# Patient Record
Sex: MALE | Race: OTHER | NOT HISPANIC OR LATINO | Employment: OTHER | URBAN - METROPOLITAN AREA
[De-identification: names, ages, dates, MRNs, and addresses within clinical notes are randomized per-mention and may not be internally consistent; named-entity substitution may affect disease eponyms.]

---

## 2022-07-26 ENCOUNTER — NEW PATIENT COMPREHENSIVE (OUTPATIENT)
Dept: URBAN - METROPOLITAN AREA CLINIC 10 | Facility: CLINIC | Age: 67
End: 2022-07-26

## 2022-07-26 DIAGNOSIS — Z01.00: ICD-10-CM

## 2022-07-26 PROCEDURE — 92004 COMPRE OPH EXAM NEW PT 1/>: CPT

## 2022-07-26 ASSESSMENT — KERATOMETRY
OD_AXISANGLE_DEGREES: 31
OD_K1POWER_DIOPTERS: 43.75
OD_K2POWER_DIOPTERS: 44.25
OS_K2POWER_DIOPTERS: 44.00
OD_AXISANGLE2_DEGREES: 121
OS_AXISANGLE_DEGREES: 137
OS_AXISANGLE2_DEGREES: 47
OS_K1POWER_DIOPTERS: 43.50

## 2022-07-26 ASSESSMENT — VISUAL ACUITY
OD_SC: 20/20
OS_SC: 20/20
OU_CC: J1+

## 2022-07-26 ASSESSMENT — TONOMETRY
OS_IOP_MMHG: 13
OD_IOP_MMHG: 13

## 2024-01-02 ENCOUNTER — OFFICE VISIT (OUTPATIENT)
Dept: URGENT CARE | Facility: CLINIC | Age: 69
End: 2024-01-02
Payer: COMMERCIAL

## 2024-01-02 VITALS
OXYGEN SATURATION: 96 % | HEIGHT: 66 IN | HEART RATE: 71 BPM | RESPIRATION RATE: 18 BRPM | BODY MASS INDEX: 33.75 KG/M2 | TEMPERATURE: 97 F | WEIGHT: 210 LBS

## 2024-01-02 DIAGNOSIS — H69.92 DYSFUNCTION OF LEFT EUSTACHIAN TUBE: Primary | ICD-10-CM

## 2024-01-02 PROCEDURE — 99213 OFFICE O/P EST LOW 20 MIN: CPT | Performed by: PHYSICIAN ASSISTANT

## 2024-01-02 RX ORDER — PREDNISONE 20 MG/1
TABLET ORAL
Qty: 15 TABLET | Refills: 0 | Status: SHIPPED | OUTPATIENT
Start: 2024-01-02

## 2024-01-02 NOTE — PATIENT INSTRUCTIONS
1. Over-the-counter acetaminophen and/or ibuprofen as needed for pain.  2. Perform eustachian tube exercises as discussed for your ear pain frequently but gently throughout the course of the day.  3. Oxymetazoline nasal spray 2 sprays in each nostril every 12 hours for no more than the next 5 days.  4. Return here for any persistent symptoms at 3-5 days or if they worsen at any time.

## 2024-01-02 NOTE — PROGRESS NOTES
[facility]        NAME: Ramiro Singh is a 68 y.o. male  : 1955    MRN: [id number]  DATE: 2024  TIME: 10:00 AM    Assessment and Plan   Dysfunction of left eustachian tube [H69.92]  1. Dysfunction of left eustachian tube  predniSONE 20 mg tablet            Patient Instructions     1. Over-the-counter acetaminophen and/or ibuprofen as needed for pain.  2. Perform eustachian tube exercises as discussed for your ear pain frequently but gently throughout the course of the day.  3. Oxymetazoline nasal spray 2 sprays in each nostril every 12 hours for no more than the next 5 days.  4. Return here for any persistent symptoms at 3-5 days or if they worsen at any time.      Chief Complaint     Chief Complaint   Patient presents with    Earache     Ear pain left ear since last night         History of Present Illness       68-year-old male patient with a 1 day history of left-sided ear pressure, pain, slight decreased hearing.  No recent URI or allergy symptoms.  Patient denies bleeding or drainage.  Denies any tenderness.  No other associated symptoms.  Has not tried any over-the-counter medications.    Earache   Associated symptoms include hearing loss. Pertinent negatives include no abdominal pain, coughing, rash, sore throat or vomiting.       Review of Systems   Review of Systems   Constitutional:  Negative for chills and fever.   HENT:  Positive for ear pain and hearing loss. Negative for sore throat.    Eyes:  Negative for pain and visual disturbance.   Respiratory:  Negative for cough and shortness of breath.    Cardiovascular:  Negative for chest pain and palpitations.   Gastrointestinal:  Negative for abdominal pain and vomiting.   Genitourinary:  Negative for dysuria and hematuria.   Musculoskeletal:  Negative for arthralgias and back pain.   Skin:  Negative for color change and rash.   Neurological:  Negative for seizures and syncope.   All other systems reviewed and are  "negative.        Current Medications       Current Outpatient Medications:     predniSONE 20 mg tablet, Take 3 tabs all at once daily for 3 days then 2 tabs for 2 days then 1 tab for 2 days., Disp: 15 tablet, Rfl: 0    Current Allergies     Allergies as of 01/02/2024    (No Known Allergies)            The following portions of the patient's history were reviewed and updated as appropriate: allergies, current medications, past family history, past medical history, past social history, past surgical history and problem list.     No past medical history on file.    No past surgical history on file.    No family history on file.      Medications have been verified.        Objective   Pulse 71   Temp (!) 97 °F (36.1 °C)   Resp 18   Ht 5' 6\" (1.676 m)   Wt 95.3 kg (210 lb)   SpO2 96%   BMI 33.89 kg/m²        Physical Exam     Physical Exam  Vitals and nursing note reviewed.   Constitutional:       General: He is not in acute distress.     Appearance: Normal appearance. He is not ill-appearing.   HENT:      Head: Normocephalic.      Right Ear: Tympanic membrane normal.      Ears:      Comments: Left TM is injected, slightly retracted.  No effusion or perforation seen.     Nose: No congestion or rhinorrhea.      Mouth/Throat:      Mouth: Mucous membranes are moist.      Pharynx: Oropharynx is clear.   Eyes:      Conjunctiva/sclera: Conjunctivae normal.      Pupils: Pupils are equal, round, and reactive to light.   Cardiovascular:      Rate and Rhythm: Normal rate and regular rhythm.      Pulses: Normal pulses.   Pulmonary:      Effort: Pulmonary effort is normal.      Breath sounds: Normal breath sounds.   Abdominal:      Tenderness: There is no abdominal tenderness.   Musculoskeletal:         General: Normal range of motion.      Cervical back: Normal range of motion and neck supple.   Skin:     General: Skin is warm and dry.      Capillary Refill: Capillary refill takes less than 2 seconds.   Neurological:      " Mental Status: He is alert and oriented to person, place, and time.   Psychiatric:         Mood and Affect: Mood normal.         Behavior: Behavior normal.

## 2024-07-12 ENCOUNTER — TELEPHONE (OUTPATIENT)
Age: 69
End: 2024-07-12

## 2024-07-12 NOTE — TELEPHONE ENCOUNTER
Patient's spouse called today to establish patient for gross hematuria.    Patient is scheduled in [place] on 7/15 at 7:20 AM with MEG Valladares.    Pt's spouse states the patient will be paying out of pocket for the visit.    Caller was provided general info regarding self pay protocol and was given the price checking tel number and website for updated rates.    Call back if needed [phone]

## 2024-07-12 NOTE — PROGRESS NOTES
7/15/2024      Chief Complaint   Patient presents with    Consult    Gross Hematuria         Assessment and Plan    69 y.o. male new patient    Gross hematuria   - Urine dip today skewed due to pyridium. Complete Bactrim as prescribed  - PVR- 8 mL   - Obtain CT urogram  - ERNESTINE negative. Obtain PSA  - Return for cystoscopy for complete work-up  - ED precautions reviewed.      History of Present Illness  Ramiro Singh is a 69 y.o. male here for new patient evaluation of gross hematuria. He reports recent episodes of gross hematuria with increased activity. He was reportedly seen at an urgent care and started on Bactrim and pyridium. No urine testing on file for review. He reports he was also having some dysuria but this resolved. He does note some intermittent right lower back pain. He has prior history of Greenlight prostate surgery 10 years ago with good benefit. No family history of  malignancy. Former smoker.       Review of Systems   Constitutional:  Negative for chills and fever.   Respiratory:  Negative for shortness of breath.    Cardiovascular:  Negative for chest pain.   Gastrointestinal:  Negative for abdominal pain.   Genitourinary:  Positive for flank pain. Negative for difficulty urinating, dysuria, frequency, hematuria, testicular pain and urgency.   Musculoskeletal:  Positive for back pain.   Neurological:  Negative for dizziness.       Past Medical History  Past Medical History:   Diagnosis Date    Urinary tract infection        Past Social History  Past Surgical History:   Procedure Laterality Date    PROSTATE SURGERY      WISDOM TOOTH EXTRACTION       Social History     Tobacco Use   Smoking Status Some Days    Types: Cigars    Passive exposure: Past   Smokeless Tobacco Never       Past Family History  History reviewed. No pertinent family history.    Past Social history  Social History     Socioeconomic History    Marital status: /Civil Union     Spouse name: Not on file    Number of  "children: Not on file    Years of education: Not on file    Highest education level: Not on file   Occupational History    Not on file   Tobacco Use    Smoking status: Some Days     Types: Cigars     Passive exposure: Past    Smokeless tobacco: Never   Vaping Use    Vaping status: Never Used   Substance and Sexual Activity    Alcohol use: Yes     Comment: ocass    Drug use: Never    Sexual activity: Not on file   Other Topics Concern    Not on file   Social History Narrative    Not on file     Social Determinants of Health     Financial Resource Strain: Not on file   Food Insecurity: Not on file   Transportation Needs: Not on file   Physical Activity: Not on file   Stress: Not on file   Social Connections: Not on file   Intimate Partner Violence: Not on file   Housing Stability: Not on file       Current Medications  Current Outpatient Medications   Medication Sig Dispense Refill    phenazopyridine (PYRIDIUM) 200 mg tablet PLEASE SEE ATTACHED FOR DETAILED DIRECTIONS      predniSONE 20 mg tablet Take 3 tabs all at once daily for 3 days then 2 tabs for 2 days then 1 tab for 2 days. 15 tablet 0    sulfamethoxazole-trimethoprim (BACTRIM DS) 800-160 mg per tablet take 1 tablet by mouth twice a day for 14 days       No current facility-administered medications for this visit.       Allergies  No Known Allergies      The following portions of the patient's history were reviewed and updated as appropriate: allergies, current medications, past medical history, past social history, past surgical history and problem list.      Vitals  Vitals:    07/15/24 0729   BP: 118/72   BP Location: Left arm   Patient Position: Sitting   Cuff Size: Standard   Pulse: 77   Resp: 18   Temp: 97.5 °F (36.4 °C)   SpO2: 94%   Weight: 92.6 kg (204 lb 3.2 oz)   Height: 5' 6\" (1.676 m)           Physical Exam  Physical Exam  Constitutional:       Appearance: Normal appearance.   HENT:      Head: Normocephalic and atraumatic.      Right Ear: External " "ear normal.      Left Ear: External ear normal.      Nose: Nose normal.   Eyes:      General: No scleral icterus.     Conjunctiva/sclera: Conjunctivae normal.   Cardiovascular:      Pulses: Normal pulses.   Pulmonary:      Effort: Pulmonary effort is normal.   Genitourinary:     Comments: No prostatic nodularity or tenderness  Musculoskeletal:         General: Normal range of motion.      Cervical back: Normal range of motion.   Skin:     General: Skin is warm and dry.   Neurological:      General: No focal deficit present.      Mental Status: He is alert and oriented to person, place, and time.   Psychiatric:         Mood and Affect: Mood normal.         Behavior: Behavior normal.         Thought Content: Thought content normal.         Judgment: Judgment normal.           Results  Recent Results (from the past 1 hour(s))   POCT Measure PVR    Collection Time: 07/15/24  7:31 AM   Result Value Ref Range    POST-VOID RESIDUAL VOLUME, ML POC 8 mL   POCT urine dip    Collection Time: 07/15/24  7:35 AM   Result Value Ref Range    BLOOD,UA -     SPECIFIC GRAVITY,UA 1.015     GLUCOSE, UA -      COLOR,UA red     CLARITY,UA clear    ]  No results found for: \"PSA\"  No results found for: \"GLUCOSE\", \"CALCIUM\", \"NA\", \"K\", \"CO2\", \"CL\", \"BUN\", \"CREATININE\"  No results found for: \"WBC\", \"HGB\", \"HCT\", \"MCV\", \"PLT\"        Orders  Orders Placed This Encounter   Procedures    POCT Measure PVR    POCT urine dip       Alicia Potter"

## 2024-07-15 ENCOUNTER — OFFICE VISIT (OUTPATIENT)
Dept: UROLOGY | Facility: CLINIC | Age: 69
End: 2024-07-15

## 2024-07-15 VITALS
BODY MASS INDEX: 32.82 KG/M2 | HEIGHT: 66 IN | RESPIRATION RATE: 18 BRPM | SYSTOLIC BLOOD PRESSURE: 118 MMHG | DIASTOLIC BLOOD PRESSURE: 72 MMHG | HEART RATE: 77 BPM | TEMPERATURE: 97.5 F | WEIGHT: 204.2 LBS | OXYGEN SATURATION: 94 %

## 2024-07-15 DIAGNOSIS — R31.0 GROSS HEMATURIA: Primary | ICD-10-CM

## 2024-07-15 DIAGNOSIS — Z12.5 SCREENING FOR PROSTATE CANCER: ICD-10-CM

## 2024-07-15 LAB
POST-VOID RESIDUAL VOLUME, ML POC: 8 ML
SL AMB  POCT GLUCOSE, UA: NORMAL
SL AMB POCT BLOOD,UA: NORMAL
SL AMB POCT CLARITY,UA: CLEAR
SL AMB POCT COLOR,UA: NORMAL
SL AMB POCT SPECIFIC GRAVITY,UA: 1.01

## 2024-07-15 PROCEDURE — 51798 US URINE CAPACITY MEASURE: CPT | Performed by: PHYSICIAN ASSISTANT

## 2024-07-15 PROCEDURE — 99204 OFFICE O/P NEW MOD 45 MIN: CPT | Performed by: PHYSICIAN ASSISTANT

## 2024-07-15 PROCEDURE — 88112 CYTOPATH CELL ENHANCE TECH: CPT | Performed by: PATHOLOGY

## 2024-07-15 PROCEDURE — 81002 URINALYSIS NONAUTO W/O SCOPE: CPT | Performed by: PHYSICIAN ASSISTANT

## 2024-07-15 RX ORDER — PHENAZOPYRIDINE HYDROCHLORIDE 200 MG/1
TABLET, FILM COATED ORAL
COMMUNITY
Start: 2024-07-12

## 2024-07-15 RX ORDER — SULFAMETHOXAZOLE AND TRIMETHOPRIM 800; 160 MG/1; MG/1
TABLET ORAL
COMMUNITY
Start: 2024-07-12

## 2024-07-17 PROCEDURE — 88112 CYTOPATH CELL ENHANCE TECH: CPT | Performed by: PATHOLOGY

## 2024-07-22 ENCOUNTER — TELEPHONE (OUTPATIENT)
Dept: OTHER | Facility: HOSPITAL | Age: 69
End: 2024-07-22

## 2024-07-22 ENCOUNTER — HOSPITAL ENCOUNTER (OUTPATIENT)
Dept: RADIOLOGY | Facility: HOSPITAL | Age: 69
Discharge: HOME | End: 2024-07-22
Payer: COMMERCIAL

## 2024-07-22 DIAGNOSIS — R31.0 GROSS HEMATURIA: ICD-10-CM

## 2024-07-22 PROCEDURE — 74178 CT ABD&PLV WO CNTR FLWD CNTR: CPT

## 2024-07-22 RX ADMIN — IOHEXOL 100 ML: 350 INJECTION, SOLUTION INTRAVENOUS at 08:12

## 2024-07-22 NOTE — TELEPHONE ENCOUNTER
Patient seen by Alicia at [place]-gross hematuria    Please let patient know his CT scan is finalized.  There is a stone in the bladder.  He has upcoming cystoscopy in about 2 weeks.  He still needs this for workup- will discuss the ct details then.

## 2024-07-24 NOTE — TELEPHONE ENCOUNTER
Lvm providing office number     If pt calls back please relay Nancy GAMBOA message     Please let patient know his CT scan is finalized.  There is a stone in the bladder.  He has upcoming cystoscopy in about 2 weeks.  He still needs this for workup- will discuss the ct details then.

## 2024-07-26 NOTE — TELEPHONE ENCOUNTER
Patient returned missed call.    Message from provider was relayed.     Pt confirmed 8/2 appt 9:30 Cysto.    No further action is needed at this time.

## 2024-07-26 NOTE — TELEPHONE ENCOUNTER
Called and left VM for the PT to call the Office back. Office number left for the PT.    IF PT calls back please let PT know results as well as Nancy NAVAS-MARYBETH recommendations and Confirm Cysto appt.

## 2024-08-01 PROBLEM — R31.0 GROSS HEMATURIA: Status: ACTIVE | Noted: 2024-08-01

## 2024-08-01 PROBLEM — Z71.2 ENCOUNTER TO DISCUSS TEST RESULTS: Status: ACTIVE | Noted: 2024-08-01

## 2024-08-01 PROBLEM — N40.1 BPH WITH OBSTRUCTION/LOWER URINARY TRACT SYMPTOMS: Status: ACTIVE | Noted: 2024-08-01

## 2024-08-01 PROBLEM — N13.8 BPH WITH OBSTRUCTION/LOWER URINARY TRACT SYMPTOMS: Status: ACTIVE | Noted: 2024-08-01

## 2024-08-01 NOTE — PROGRESS NOTES
Ambulatory Visit  Name: Ramiro Singh      : 1955      MRN: [id number]  Encounter Provider: Anmol Rizvi MD  Encounter Date: 2024   Encounter department: [facility] [place]    Assessment & Plan   1. BPH with obstruction/lower urinary tract symptoms  Assessment & Plan:  Enlarged prostate with bleeding and slow stream, some nocturia as well.  He did have a greenlight laser prostatectomy previously many years ago which did help some with his symptoms.  I spoke with him about the pre-, jason-, postoperative care for transurethral resection of prostate, he is amenable, we will proceed to this in roughly 3 months  Orders:  -     finasteride (PROSCAR) 5 mg tablet; Take 1 tablet (5 mg total) by mouth daily  -     Case request operating room: TRANSURETHRAL RESECTION OF PROSTATE (TURP); Standing  -     Case request operating room: TRANSURETHRAL RESECTION OF PROSTATE (TURP)  2. Gross hematuria  Assessment & Plan:  Gross hematuria with clots, imaging shows no hydronephrosis, no stones, no renal masses.  Likely from his enlarged prostate, recommend TURP  Orders:  -     Case request operating room: TRANSURETHRAL RESECTION OF PROSTATE (TURP); Standing  -     Case request operating room: TRANSURETHRAL RESECTION OF PROSTATE (TURP)  3. Encounter to discuss test results  Assessment & Plan:  All findings reviewed with him in real-time.  All questions and concerns answered and addressed.      4. Bladder stone  Assessment & Plan:  CT renal protocol shows a likely bladder stone versus dystrophic calcification from previous greenlight laser prostatectomy.  Unable to place cystoscope today due to fossa navicularis stricture.  Recommend transurethral resection of prostate with all indicated procedures after a 3-month period of 5 alpha reductase inhibition given his 86 g gland  5. Postprocedural stricture of anterior urethra  Assessment & Plan:  Stricture at the fossa navicularis, approximately 5-6 Hong Konger,  "unable to pass a cystoscope.  We can dilate this at the time of his surgery          History of Present Illness     Ramiro Singh is a 69 y.o. male who presents with gross hematuria and lower urinary tract symptoms, hematuria has resolved.  Upper tract imaging is negative, he is seen to have a bladder stone.  Unable to perform cystoscopy today due to a fossa navicularis stricture.    Amenable to TURP.    The following portions of the patient's history were reviewed and updated as appropriate: allergies, current medications, past family history, past medical history, past social history, past surgical history and problem list.    Review of Systems   Constitutional: Negative.    HENT: Negative.     Eyes: Negative.    Respiratory: Negative.     Cardiovascular: Negative.    Gastrointestinal: Negative.    Endocrine: Negative.    Genitourinary:  Positive for difficulty urinating and hematuria.   Musculoskeletal: Negative.    Skin: Negative.    Allergic/Immunologic: Negative.    Neurological: Negative.    Hematological: Negative.    Psychiatric/Behavioral: Negative.           Objective     /68 (BP Location: Left arm, Patient Position: Sitting, Cuff Size: Standard)   Pulse 84   Ht 5' 6\" (1.676 m)   Wt 93 kg (205 lb)   SpO2 98%   BMI 33.09 kg/m²   Physical Exam  Vitals reviewed.   Constitutional:       General: He is not in acute distress.     Appearance: Normal appearance. He is well-developed. He is not ill-appearing, toxic-appearing or diaphoretic.   HENT:      Head: Normocephalic and atraumatic.      Nose: Nose normal.      Mouth/Throat:      Mouth: Mucous membranes are moist.   Eyes:      General: No scleral icterus.        Right eye: No discharge.         Left eye: No discharge.   Neck:      Thyroid: No thyromegaly.      Trachea: No tracheal deviation.   Pulmonary:      Effort: Pulmonary effort is normal.   Chest:      Chest wall: No tenderness.   Abdominal:      General: There is no distension.      " "Palpations: There is no mass.      Tenderness: There is no abdominal tenderness. There is no guarding.      Hernia: No hernia is present.   Genitourinary:     Penis: Normal.       Comments: Uncircumcised, foreskin reduced down over the head of the penis  Musculoskeletal:         General: No deformity or signs of injury. Normal range of motion.      Cervical back: Normal range of motion and neck supple.   Lymphadenopathy:      Cervical: No cervical adenopathy.   Skin:     General: Skin is dry.      Coloration: Skin is not jaundiced or pale.      Findings: No erythema.   Neurological:      Mental Status: He is alert and oriented to person, place, and time.      Cranial Nerves: No cranial nerve deficit.      Motor: No abnormal muscle tone.      Coordination: Coordination normal.   Psychiatric:         Mood and Affect: Mood normal.         Behavior: Behavior normal.         Thought Content: Thought content normal.         Judgment: Judgment normal.       Results  No results found for: \"PSA\"  No results found for: \"GLUCOSE\", \"CALCIUM\", \"NA\", \"K\", \"CO2\", \"CL\", \"BUN\", \"CREATININE\"  No results found for: \"WBC\", \"HGB\", \"HCT\", \"MCV\", \"PLT\"    Office Urine Dip  No results found for this or any previous visit (from the past 1 hour(s)).]    Administrative Statements           "

## 2024-08-02 ENCOUNTER — PROCEDURE VISIT (OUTPATIENT)
Dept: UROLOGY | Facility: CLINIC | Age: 69
End: 2024-08-02
Payer: COMMERCIAL

## 2024-08-02 VITALS
HEART RATE: 84 BPM | SYSTOLIC BLOOD PRESSURE: 120 MMHG | WEIGHT: 205 LBS | HEIGHT: 66 IN | OXYGEN SATURATION: 98 % | BODY MASS INDEX: 32.95 KG/M2 | DIASTOLIC BLOOD PRESSURE: 68 MMHG

## 2024-08-02 DIAGNOSIS — Z71.2 ENCOUNTER TO DISCUSS TEST RESULTS: Primary | ICD-10-CM

## 2024-08-02 DIAGNOSIS — N40.1 BPH WITH OBSTRUCTION/LOWER URINARY TRACT SYMPTOMS: ICD-10-CM

## 2024-08-02 DIAGNOSIS — R31.0 GROSS HEMATURIA: ICD-10-CM

## 2024-08-02 DIAGNOSIS — N99.114 POSTPROCEDURAL STRICTURE OF ANTERIOR URETHRA: ICD-10-CM

## 2024-08-02 DIAGNOSIS — N13.8 BPH WITH OBSTRUCTION/LOWER URINARY TRACT SYMPTOMS: ICD-10-CM

## 2024-08-02 DIAGNOSIS — N21.0 BLADDER STONE: ICD-10-CM

## 2024-08-02 PROCEDURE — 52000 CYSTOURETHROSCOPY: CPT | Performed by: UROLOGY

## 2024-08-02 PROCEDURE — 76872 US TRANSRECTAL: CPT | Performed by: UROLOGY

## 2024-08-02 PROCEDURE — 99204 OFFICE O/P NEW MOD 45 MIN: CPT | Performed by: UROLOGY

## 2024-08-02 RX ORDER — FINASTERIDE 5 MG/1
5 TABLET, FILM COATED ORAL DAILY
Qty: 90 TABLET | Refills: 3 | Status: SHIPPED | OUTPATIENT
Start: 2024-08-02 | End: 2025-07-28

## 2024-08-02 RX ORDER — ACETAMINOPHEN 325 MG/1
975 TABLET ORAL ONCE
OUTPATIENT
Start: 2024-08-02 | End: 2024-08-02

## 2024-08-02 NOTE — ASSESSMENT & PLAN NOTE
All findings reviewed with him in real-time.  All questions and concerns answered and addressed.

## 2024-08-02 NOTE — ASSESSMENT & PLAN NOTE
Gross hematuria with clots, imaging shows no hydronephrosis, no stones, no renal masses.  Likely from his enlarged prostate, recommend TURP

## 2024-08-02 NOTE — ASSESSMENT & PLAN NOTE
Stricture at the fossa navicularis, approximately 5-6 Nepalese, unable to pass a cystoscope.  We can dilate this at the time of his surgery

## 2024-08-02 NOTE — PROGRESS NOTES
Office TRUS     Indication    Benign prostatic enlargement with lower urinary tract symptoms    Informed consent   The risks, benefits and alternatives to this procedures were discussed with the patient and he has participated in the informed consent process and wishes to proceed        Transrectal ultrasonography  The patient was placed in the left lateral decubitus position. After an attentive digital rectal examination, a 7.5 mHz sidefire ultrasound probe was gently inserted into the rectum and biplanar imaging of the prostate was done with the findings noted below. Images were taken of any abnormal findings and also to document prostate size.    Bladder  The bladder base appeared normal.    Prostate      Ultrasound size measurements:  -Volume:  86 cm3    Ultrasound findings:  -Cysts: None  -Masses: None  -Median lobe: absent                  Complications  There were no procedural complications.    Disposition  The patient was dismissed to home     Post-procedure instructions:     Today he underwent an uncomplicated transrectal ultrasound showing a 86 g gland with bilobar hypertrophy and elevation of the bladder neck.  I recommend a TURP after a period of treatment with a 5 alpha reductase inhibitor.        Biopsy prostate     Date/Time  8/2/2024 9:30 AM     Performed by  Anmol Rizvi MD   Authorized by  Anmol Rizvi MD     Universal Protocol   Consent: Verbal consent obtained. Written consent obtained.  Risks and benefits: risks, benefits and alternatives were discussed  Consent given by: patient  Patient understanding: patient states understanding of the procedure being performed  Patient consent: the patient's understanding of the procedure matches consent given  Procedure consent: procedure consent matches procedure scheduled  Relevant documents: relevant documents present and verified  Test results: test results available and properly labeled  Site marked: the operative site was not  marked  Radiology Images displayed and confirmed. If images not available, report reviewed: imaging studies available  Required items: required blood products, implants, devices, and special equipment available  Patient identity confirmed: verbally with patient and provided demographic data      Local anesthesia used: no     Anesthesia   Local anesthesia used: no     Sedation   Patient sedated: no        Specimen: no    Culture: no   Procedure Details   Procedure Notes: Office TRUS     Indication    Benign prostatic enlargement with lower urinary tract symptoms    Informed consent   The risks, benefits and alternatives to this procedures were discussed with the patient and he has participated in the informed consent process and wishes to proceed        Transrectal ultrasonography  The patient was placed in the left lateral decubitus position. After an attentive digital rectal examination, a 7.5 mHz sidefire ultrasound probe was gently inserted into the rectum and biplanar imaging of the prostate was done with the findings noted below. Images were taken of any abnormal findings and also to document prostate size.    Bladder  The bladder base appeared normal.    Prostate      Ultrasound size measurements:  -Volume:  86 cm3    Ultrasound findings:  -Cysts: None  -Masses: None  -Median lobe: absent                  Complications  There were no procedural complications.    Disposition  The patient was dismissed to home     Post-procedure instructions:     Today he underwent an uncomplicated transrectal ultrasound showing a 86 g gland with bilobar hypertrophy and elevation of the bladder neck.  I recommend a TURP after a period of treatment with a 5 alpha reductase inhibitor.  Patient Transportation: confirmed  Patient tolerance: patient tolerated the procedure well with no immediate complications

## 2024-08-02 NOTE — ASSESSMENT & PLAN NOTE
CT renal protocol shows a likely bladder stone versus dystrophic calcification from previous greenlight laser prostatectomy.  Unable to place cystoscope today due to fossa navicularis stricture.  Recommend transurethral resection of prostate with all indicated procedures after a 3-month period of 5 alpha reductase inhibition given his 86 g gland

## 2024-08-02 NOTE — ASSESSMENT & PLAN NOTE
Enlarged prostate with bleeding and slow stream, some nocturia as well.  He did have a greenlight laser prostatectomy previously many years ago which did help some with his symptoms.  I spoke with him about the pre-, jason-, postoperative care for transurethral resection of prostate, he is amenable, we will proceed to this in roughly 3 months

## 2024-08-02 NOTE — PROGRESS NOTES
Office Cystoscopy Procedure Note    Indication:    Hematuria    Informed consent   The risks, benefits, complications, treatment options, and expected outcomes were discussed with the patient. The patient concurred with the proposed plan and provided informed consent.    Anesthesia  Lidocaine jelly 2%    Antibiotic prophylaxis   None    Procedure  The patient was placed in the supineposition, was prepped and draped in the usual manner using sterile technique, and 2% lidocaine jelly instilled into the urethra.  A 17 F flexible cystoscope was then inserted into the urethra and the urethra and bladder carefully examined.  The following findings were noted:    Findings:  Urethra:  Abnormal, fossa navicularis stricture, unable to pass scope    Specimens: None                 Complications:    None; patient tolerated the procedure well           Disposition: To home            Condition: Stable    Plan: Recommend dilation of stricture at the time of transurethral resection of prostate.       Cystoscopy     Date/Time  8/2/2024 9:30 AM     Performed by  Anmol Rizvi MD   Authorized by  Anmol Rizvi MD     Universal Protocol:  Consent: Verbal consent obtained. Written consent obtained.  Risks and benefits: risks, benefits and alternatives were discussed  Consent given by: patient  Patient understanding: patient states understanding of the procedure being performed  Patient consent: the patient's understanding of the procedure matches consent given  Procedure consent: procedure consent matches procedure scheduled  Relevant documents: relevant documents present and verified  Test results: test results available and properly labeled  Site marked: the operative site was not marked  Radiology Images displayed and confirmed. If images not available, report reviewed: imaging studies available  Required items: required blood products, implants, devices, and special equipment available  Patient identity confirmed: verbally  with patient and provided demographic data      Procedure Details:  Procedure type: cystoscopy    Patient tolerance: Patient tolerated the procedure well with no immediate complications    Additional Procedure Details: Office Cystoscopy Procedure Note    Indication:    Hematuria    Informed consent   The risks, benefits, complications, treatment options, and expected outcomes were discussed with the patient. The patient concurred with the proposed plan and provided informed consent.    Anesthesia  Lidocaine jelly 2%    Antibiotic prophylaxis   None    Procedure  The patient was placed in the supineposition, was prepped and draped in the usual manner using sterile technique, and 2% lidocaine jelly instilled into the urethra.  A 17 F flexible cystoscope was then inserted into the urethra and the urethra and bladder carefully examined.  The following findings were noted:    Findings:  Urethra:  Abnormal, fossa navicularis stricture, unable to pass scope    Specimens: None                 Complications:    None; patient tolerated the procedure well           Disposition: To home            Condition: Stable    Plan: Recommend dilation of stricture at the time of transurethral resection of prostate.

## 2024-08-07 ENCOUNTER — PREP FOR PROCEDURE (OUTPATIENT)
Dept: UROLOGY | Facility: CLINIC | Age: 69
End: 2024-08-07

## 2024-08-07 ENCOUNTER — TELEPHONE (OUTPATIENT)
Dept: UROLOGY | Facility: CLINIC | Age: 69
End: 2024-08-07

## 2024-08-07 DIAGNOSIS — R39.89 SUSPECTED UTI: ICD-10-CM

## 2024-08-07 DIAGNOSIS — Z01.810 PRE-OPERATIVE CARDIOVASCULAR EXAMINATION: ICD-10-CM

## 2024-08-07 DIAGNOSIS — N13.8 BPH WITH OBSTRUCTION/LOWER URINARY TRACT SYMPTOMS: Primary | ICD-10-CM

## 2024-08-07 DIAGNOSIS — Z79.01 LONG TERM (CURRENT) USE OF ANTICOAGULANTS: ICD-10-CM

## 2024-08-07 DIAGNOSIS — R31.0 GROSS HEMATURIA: ICD-10-CM

## 2024-08-07 DIAGNOSIS — N40.1 BPH WITH OBSTRUCTION/LOWER URINARY TRACT SYMPTOMS: Primary | ICD-10-CM

## 2024-08-07 DIAGNOSIS — Z01.812 PRE-OPERATIVE LABORATORY EXAMINATION: ICD-10-CM

## 2024-08-07 NOTE — TELEPHONE ENCOUNTER
Spoke with patient and confirmed surgery date of:11/19/24  Type of surgery:TURP  Operating physician: Dr. Rizvi  Location of surgery: [facility]    Verbally went over prep with patient on: 8/7/24  NPO  Bowel prep? No  Hospital calls afternoon prior with arrival time -Calls Friday afternoon for Monday surgeries  Patient needs ride to and from surgery (outpatient/inpatient)   Pre-op testing to be done 2 weeks prior to surgery/PRE OP LABS, URINE CULTURE EKG ORDERED FOR 10/29/24  (list labs needed)  Blood thinners:   List blood thinner/how long needs to held or no hold needed  Clearances needed: (Medical/Cardiac/None)    Mailed/emailed to patient on:EMAILED AND MAILED TO PT 8/8/24  Copy of packet scanned into Media on:8/8/24  Labs in packet  Soap / Bowel prep in packet  Pre-op & Post-op in packet  Dates of H&P and post-op if needed    Consent: in Media or on admit  If needed:CONSENT TO BE SIGNED AT PRE OP APPT    Medical/Cardiac Clearance:  Appt with:  Appt date and time:  Date clearance form faxed:  Best fax number:    Medication Suspension of: Medication Name / how many days  Ordering provider:  Faxed Medication Suspension form on:  Best fax number:    [place]:  Faxed form to pharmacy on:    RBC blood bank done on:    Rep info:  Emailed rep on date:

## 2024-11-02 ENCOUNTER — APPOINTMENT (OUTPATIENT)
Dept: LAB | Facility: HOSPITAL | Age: 69
End: 2024-11-02
Payer: COMMERCIAL

## 2024-11-02 ENCOUNTER — APPOINTMENT (OUTPATIENT)
Dept: LAB | Facility: HOSPITAL | Age: 69
End: 2024-11-02

## 2024-11-02 DIAGNOSIS — R39.89 SUSPECTED UTI: ICD-10-CM

## 2024-11-02 DIAGNOSIS — Z79.01 LONG TERM (CURRENT) USE OF ANTICOAGULANTS: ICD-10-CM

## 2024-11-02 DIAGNOSIS — R31.0 GROSS HEMATURIA: ICD-10-CM

## 2024-11-02 DIAGNOSIS — N13.8 BPH WITH OBSTRUCTION/LOWER URINARY TRACT SYMPTOMS: ICD-10-CM

## 2024-11-02 DIAGNOSIS — N40.1 BPH WITH OBSTRUCTION/LOWER URINARY TRACT SYMPTOMS: ICD-10-CM

## 2024-11-02 DIAGNOSIS — Z01.812 PRE-OPERATIVE LABORATORY EXAMINATION: ICD-10-CM

## 2024-11-02 DIAGNOSIS — Z01.810 PRE-OPERATIVE CARDIOVASCULAR EXAMINATION: ICD-10-CM

## 2024-11-02 DIAGNOSIS — Z12.5 SCREENING FOR PROSTATE CANCER: ICD-10-CM

## 2024-11-02 LAB — PSA SERPL-MCNC: 1.81 NG/ML (ref 0–4)

## 2024-11-02 PROCEDURE — 86850 RBC ANTIBODY SCREEN: CPT | Performed by: UROLOGY

## 2024-11-02 PROCEDURE — G0103 PSA SCREENING: HCPCS

## 2024-11-02 PROCEDURE — 86901 BLOOD TYPING SEROLOGIC RH(D): CPT | Performed by: UROLOGY

## 2024-11-02 PROCEDURE — 86900 BLOOD TYPING SEROLOGIC ABO: CPT | Performed by: UROLOGY

## 2024-11-03 ENCOUNTER — LAB REQUISITION (OUTPATIENT)
Dept: LAB | Facility: HOSPITAL | Age: 69
End: 2024-11-03
Payer: COMMERCIAL

## 2024-11-03 DIAGNOSIS — R31.0 GROSS HEMATURIA: ICD-10-CM

## 2024-11-03 DIAGNOSIS — N13.8 OTHER OBSTRUCTIVE AND REFLUX UROPATHY: ICD-10-CM

## 2024-11-03 DIAGNOSIS — N40.1 BENIGN PROSTATIC HYPERPLASIA WITH LOWER URINARY TRACT SYMPTOMS: ICD-10-CM

## 2024-11-03 LAB
ABO GROUP BLD: NORMAL
BLD GP AB SCN SERPL QL: NEGATIVE
RH BLD: NEGATIVE
SPECIMEN EXPIRATION DATE: NORMAL

## 2024-11-05 ENCOUNTER — TELEPHONE (OUTPATIENT)
Dept: UROLOGY | Facility: CLINIC | Age: 69
End: 2024-11-05

## 2024-11-05 DIAGNOSIS — R82.71 BACTERIURIA: Primary | ICD-10-CM

## 2024-11-05 PROCEDURE — 87086 URINE CULTURE/COLONY COUNT: CPT

## 2024-11-05 RX ORDER — METRONIDAZOLE 500 MG/1
500 TABLET ORAL EVERY 8 HOURS SCHEDULED
Qty: 21 TABLET | Refills: 0 | Status: SHIPPED | OUTPATIENT
Start: 2024-11-05 | End: 2024-11-12

## 2024-11-05 NOTE — TELEPHONE ENCOUNTER
Please call the patient and let him know that I am treating him with Flagyl for 7 days based on his preoperative urine culture, please have him start this in preparation for TURP.  He can start this medication now

## 2024-11-05 NOTE — TELEPHONE ENCOUNTER
Spoke w/ pt/pt wants to reschedule his upcoming surgery at this time/pt rescheduled form 11/19/2024 w/ Dr Rizvi at [facility] to 1/21/2025

## 2024-11-05 NOTE — TELEPHONE ENCOUNTER
Patient was not seen today for pre-op appointment. He wants to reschedule his surgery further out and per Alejandra, his pre op appointment will need to be rescheduled as well. Patient was not seen on 11/5. Please assist patient in rescheduling surgery. Thank you!

## 2024-11-06 ENCOUNTER — TELEPHONE (OUTPATIENT)
Dept: UROLOGY | Facility: CLINIC | Age: 69
End: 2024-11-06

## 2024-11-06 LAB — BACTERIA UR CULT: NORMAL

## 2024-11-06 NOTE — TELEPHONE ENCOUNTER
Called and spoke directly with patient and advised of provider note below:    ----- Message from KARRI Hackett sent at 11/6/2024  2:09 PM EST -----  Patient's urine is negative for infection.     Patient verbalized understanding and thanked the office for the call

## 2024-11-07 LAB
ATRIAL RATE: 77 BPM
P AXIS: 51 DEGREES
PR INTERVAL: 180 MS
QRS AXIS: -18 DEGREES
QRSD INTERVAL: 92 MS
QT INTERVAL: 360 MS
QTC INTERVAL: 407 MS
T WAVE AXIS: 35 DEGREES
VENTRICULAR RATE: 77 BPM

## 2024-11-07 PROCEDURE — 93010 ELECTROCARDIOGRAM REPORT: CPT | Performed by: INTERNAL MEDICINE

## 2025-05-01 NOTE — PRE-PROCEDURE INSTRUCTIONS
Pre-Surgery Instructions:   Medication Instructions    loratadine-pseudoephedrine (CLARITIN-D 12-HOUR) 5-120 mg per tablet Hold day of surgery      Medication instructions for day of surgery reviewed. Please take all instructed medications with only a sip of water.       You will receive a call one business day prior to surgery with an arrival time and hospital directions. If your surgery is scheduled on a Monday, the hospital will be calling you on the Friday prior to your surgery. If you have not heard from anyone by 8pm, please call the hospital supervisor through the hospital  at [phone]. ([place] [phone] or [place] [phone]).    Do not eat or drink anything after midnight the night before your surgery, including candy, mints, lifesavers, or chewing gum. Do not drink alcohol 24hrs before your surgery. Try not to smoke at least 24hrs before your surgery.       Follow the pre surgery showering instructions as listed in the “My Surgical Experience Booklet” or otherwise provided by your surgeon's office. Do not use a blade to shave the surgical area 1 week before surgery. It is okay to use a clean electric clippers up to 24 hours before surgery. Do not apply any lotions, creams, including makeup, cologne, deodorant, or perfumes after showering on the day of your surgery. Do not use dry shampoo, hair spray, hair gel, or any type of hair products.     No contact lenses, eye make-up, or artificial eyelashes. Remove nail polish, including gel polish, and any artificial, gel, or acrylic nails if possible. Remove all jewelry including rings and body piercing jewelry.     Wear causal clothing that is easy to take on and off. Consider your type of surgery.    Keep any valuables, jewelry, piercings at home. Please bring any specially ordered equipment (sling, braces) if indicated.    Arrange for a responsible person to drive you to and from the hospital on the day of your surgery. Please confirm the  visitor policy for the day of your procedure when you receive your phone call with an arrival time.     Call the surgeon's office with any new illnesses, exposures, or additional questions prior to surgery.    Please reference your “My Surgical Experience Booklet” for additional information to prepare for your upcoming surgery.

## 2025-05-02 ENCOUNTER — OFFICE VISIT (OUTPATIENT)
Dept: LAB | Facility: HOSPITAL | Age: 70
End: 2025-05-02
Attending: SPECIALIST
Payer: COMMERCIAL

## 2025-05-02 ENCOUNTER — APPOINTMENT (OUTPATIENT)
Dept: LAB | Facility: HOSPITAL | Age: 70
End: 2025-05-02
Payer: COMMERCIAL

## 2025-05-02 DIAGNOSIS — N35.111 POSTINFECTIVE URETHRAL STRICTURE, NEC, MALE, MEATAL: ICD-10-CM

## 2025-05-02 LAB
ANION GAP SERPL CALCULATED.3IONS-SCNC: 8 MMOL/L (ref 4–13)
BUN SERPL-MCNC: 20 MG/DL (ref 5–25)
CALCIUM SERPL-MCNC: 8.6 MG/DL (ref 8.4–10.2)
CHLORIDE SERPL-SCNC: 103 MMOL/L (ref 96–108)
CO2 SERPL-SCNC: 22 MMOL/L (ref 21–32)
CREAT SERPL-MCNC: 0.9 MG/DL (ref 0.6–1.3)
ERYTHROCYTE [DISTWIDTH] IN BLOOD BY AUTOMATED COUNT: 16.2 % (ref 11.6–15.1)
GFR SERPL CREATININE-BSD FRML MDRD: 87 ML/MIN/1.73SQ M
GLUCOSE SERPL-MCNC: 94 MG/DL (ref 65–140)
HCT VFR BLD AUTO: 40.1 % (ref 36.5–49.3)
HGB BLD-MCNC: 12.6 G/DL (ref 12–17)
MCH RBC QN AUTO: 20.1 PG (ref 26.8–34.3)
MCHC RBC AUTO-ENTMCNC: 31.4 G/DL (ref 31.4–37.4)
MCV RBC AUTO: 64 FL (ref 82–98)
PLATELET # BLD AUTO: 316 THOUSANDS/UL (ref 149–390)
PMV BLD AUTO: 9.9 FL (ref 8.9–12.7)
POTASSIUM SERPL-SCNC: 4.1 MMOL/L (ref 3.5–5.3)
RBC # BLD AUTO: 6.26 MILLION/UL (ref 3.88–5.62)
SODIUM SERPL-SCNC: 133 MMOL/L (ref 135–147)
WBC # BLD AUTO: 7.1 THOUSAND/UL (ref 4.31–10.16)

## 2025-05-02 PROCEDURE — 80048 BASIC METABOLIC PNL TOTAL CA: CPT

## 2025-05-02 PROCEDURE — 93005 ELECTROCARDIOGRAM TRACING: CPT

## 2025-05-02 PROCEDURE — 36415 COLL VENOUS BLD VENIPUNCTURE: CPT

## 2025-05-02 PROCEDURE — 85027 COMPLETE CBC AUTOMATED: CPT

## 2025-05-06 LAB
ATRIAL RATE: 77 BPM
P AXIS: 56 DEGREES
PR INTERVAL: 184 MS
QRS AXIS: -20 DEGREES
QRSD INTERVAL: 92 MS
QT INTERVAL: 372 MS
QTC INTERVAL: 421 MS
T WAVE AXIS: 31 DEGREES
VENTRICULAR RATE: 77 BPM

## 2025-05-06 PROCEDURE — 93010 ELECTROCARDIOGRAM REPORT: CPT | Performed by: INTERNAL MEDICINE

## 2025-05-07 NOTE — H&P
H&P Exam - Urology       Patient: Ramiro Singh   : 1955 Sex: male   MRN: [id number]     CSN: [id number]      History of Present Illness   HPI:  Ramiro Singh is a 70 y.o. male who presents with gross hematuria found on CAT scan to have bladder stone undergoing attempted office cystoscopy found to have urethral stricture disease unable to complete cystoscopy now to come in to [facility] ([facility] under anesthesia for retrograde urethrogram cystoscopy DVIU possible Kenalog injection laser bladder stone possible TURBT bilateral retrograde pyelogram low risk of anesthesia infection bleeding postop catheter        Review of Systems:   Constitutional:  Negative for activity change, fever, chills and diaphoresis.   HENT: Negative for hearing loss and trouble swallowing.   Eyes: Negative for itching and visual disturbance.   Respiratory: Negative for chest tightness and shortness of breath.   Cardiovascular: Negative for chest pain, edema.   Gastrointestinal: Negative for abdominal distention, na abdominal pain, constipation, diarrhea, Nausea and vomiting.   Genitourinary: Negative for decreased urine volume, difficulty urinating, dysuria, enuresis, frequency, hematuria and urgency.   Musculoskeletal: Negative for gait problem and myalgias.   Neurological: Negative for dizziness and headaches.   Hematological: Does not bruise/bleed easily.       Historical Information   Past Medical History:   Diagnosis Date    Asthma     Kidney stone     Urinary tract infection      Past Surgical History:   Procedure Laterality Date    PROSTATE SURGERY      WISDOM TOOTH EXTRACTION       Social History   Social History     Substance and Sexual Activity   Alcohol Use Yes    Alcohol/week: 1.0 standard drink of alcohol    Types: 1 Cans of beer per week    Comment: a beer or two once a week     Social History     Substance and Sexual Activity   Drug Use Never     Social History     Tobacco Use   Smoking Status Some Days    Types: Cigars  "   Passive exposure: Past   Smokeless Tobacco Never   Tobacco Comments    Cigar every now and then     Family History: No family history on file.    Meds/Allergies   No medications prior to admission.  No Known Allergies    Objective   Vitals: Ht 5' 6\" (1.676 m)   Wt 93 kg (205 lb)   BMI 33.09 kg/m²     Physical Exam:  General Alert awake   Normocephalic atraumatic PERRLA  Lungs clear bilaterally  Cardiac normal S1 normal S2  Abdomen soft, flank pain  Extremities no edema    No intake/output data recorded.    Invasive Devices       None                       Lab Results: CBC:   Lab Results   Component Value Date    WBC 7.10 05/02/2025    HGB 12.6 05/02/2025    HCT 40.1 05/02/2025    MCV 64 (L) 05/02/2025     05/02/2025    RBC 6.26 (H) 05/02/2025    MCH 20.1 (L) 05/02/2025    MCHC 31.4 05/02/2025    RDW 16.2 (H) 05/02/2025    MPV 9.9 05/02/2025    NRBC 0 11/02/2024     CMP:   Lab Results   Component Value Date     05/02/2025    CO2 22 05/02/2025    BUN 20 05/02/2025    CREATININE 0.90 05/02/2025    CALCIUM 8.6 05/02/2025    EGFR 87 05/02/2025     Urinalysis:   Lab Results   Component Value Date    COLORU red 07/15/2024    CLARITYU clear 07/15/2024    GLUCOSEU - 07/15/2024    BLOODU - 07/15/2024     Urine Culture:   Lab Results   Component Value Date    URINECX No Growth <1000 cfu/mL 11/05/2024     PSA:   Lab Results   Component Value Date    PSA 1.811 11/02/2024           Assessment/ Plan:  Retrograde urethrogram cystoscopy internal urethrotomy Kenalog Injection possible TURBT laser bladder stone bilateral retrograde pyelogram      Gordon Cat MD  "

## 2025-05-08 ENCOUNTER — HOSPITAL ENCOUNTER (OUTPATIENT)
Facility: HOSPITAL | Age: 70
Setting detail: OBSERVATION
Discharge: HOME | End: 2025-05-09
Admitting: FAMILY MEDICINE
Payer: COMMERCIAL

## 2025-05-08 ENCOUNTER — HOSPITAL ENCOUNTER (OUTPATIENT)
Facility: HOSPITAL | Age: 70
Setting detail: OUTPATIENT SURGERY
Discharge: HOME | End: 2025-05-08
Attending: SPECIALIST | Admitting: SPECIALIST
Payer: COMMERCIAL

## 2025-05-08 ENCOUNTER — ANESTHESIA EVENT (OUTPATIENT)
Dept: PERIOP | Facility: HOSPITAL | Age: 70
End: 2025-05-08
Payer: COMMERCIAL

## 2025-05-08 ENCOUNTER — ANESTHESIA (OUTPATIENT)
Dept: PERIOP | Facility: HOSPITAL | Age: 70
End: 2025-05-08
Payer: COMMERCIAL

## 2025-05-08 ENCOUNTER — APPOINTMENT (OUTPATIENT)
Dept: RADIOLOGY | Facility: HOSPITAL | Age: 70
End: 2025-05-08
Payer: COMMERCIAL

## 2025-05-08 VITALS
HEART RATE: 76 BPM | RESPIRATION RATE: 16 BRPM | HEIGHT: 66 IN | OXYGEN SATURATION: 98 % | WEIGHT: 209.44 LBS | SYSTOLIC BLOOD PRESSURE: 149 MMHG | TEMPERATURE: 97.3 F | BODY MASS INDEX: 33.66 KG/M2 | DIASTOLIC BLOOD PRESSURE: 82 MMHG

## 2025-05-08 DIAGNOSIS — N35.111: ICD-10-CM

## 2025-05-08 DIAGNOSIS — R31.0 GROSS HEMATURIA: ICD-10-CM

## 2025-05-08 DIAGNOSIS — N21.0 CALCULUS IN BLADDER: ICD-10-CM

## 2025-05-08 DIAGNOSIS — T83.091A COMPLICATION, BLOCKED FOLEY CATHETER, INITIAL ENCOUNTER (HCC): Primary | ICD-10-CM

## 2025-05-08 PROBLEM — F17.200 CURRENT SMOKER ON SOME DAYS: Status: ACTIVE | Noted: 2025-05-08

## 2025-05-08 PROBLEM — R65.10 SIRS (SYSTEMIC INFLAMMATORY RESPONSE SYNDROME) (HCC): Status: ACTIVE | Noted: 2025-05-08

## 2025-05-08 PROBLEM — E66.811 OBESITY (BMI 30.0-34.9): Status: ACTIVE | Noted: 2025-05-08

## 2025-05-08 PROBLEM — E87.1 HYPONATREMIA: Status: ACTIVE | Noted: 2025-05-08

## 2025-05-08 LAB
ANION GAP SERPL CALCULATED.3IONS-SCNC: 9 MMOL/L (ref 4–13)
APTT PPP: 30 SECONDS (ref 23–34)
BASOPHILS # BLD AUTO: 0.07 THOUSANDS/ÂΜL (ref 0–0.1)
BASOPHILS NFR BLD AUTO: 1 % (ref 0–1)
BUN SERPL-MCNC: 15 MG/DL (ref 5–25)
CALCIUM SERPL-MCNC: 8.5 MG/DL (ref 8.4–10.2)
CHLORIDE SERPL-SCNC: 102 MMOL/L (ref 96–108)
CO2 SERPL-SCNC: 23 MMOL/L (ref 21–32)
CREAT SERPL-MCNC: 0.88 MG/DL (ref 0.6–1.3)
EOSINOPHIL # BLD AUTO: 0.19 THOUSAND/ÂΜL (ref 0–0.61)
EOSINOPHIL NFR BLD AUTO: 1 % (ref 0–6)
ERYTHROCYTE [DISTWIDTH] IN BLOOD BY AUTOMATED COUNT: 17.2 % (ref 11.6–15.1)
GFR SERPL CREATININE-BSD FRML MDRD: 88 ML/MIN/1.73SQ M
GLUCOSE SERPL-MCNC: 96 MG/DL (ref 65–140)
HCT VFR BLD AUTO: 38.7 % (ref 36.5–49.3)
HGB BLD-MCNC: 12.3 G/DL (ref 12–17)
IMM GRANULOCYTES # BLD AUTO: 0.06 THOUSAND/UL (ref 0–0.2)
IMM GRANULOCYTES NFR BLD AUTO: 0 % (ref 0–2)
INR PPP: 0.97 (ref 0.85–1.19)
LYMPHOCYTES # BLD AUTO: 1.62 THOUSANDS/ÂΜL (ref 0.6–4.47)
LYMPHOCYTES NFR BLD AUTO: 12 % (ref 14–44)
MCH RBC QN AUTO: 20.4 PG (ref 26.8–34.3)
MCHC RBC AUTO-ENTMCNC: 31.8 G/DL (ref 31.4–37.4)
MCV RBC AUTO: 64 FL (ref 82–98)
MONOCYTES # BLD AUTO: 1.06 THOUSAND/ÂΜL (ref 0.17–1.22)
MONOCYTES NFR BLD AUTO: 8 % (ref 4–12)
NEUTROPHILS # BLD AUTO: 10.69 THOUSANDS/ÂΜL (ref 1.85–7.62)
NEUTS SEG NFR BLD AUTO: 78 % (ref 43–75)
NRBC BLD AUTO-RTO: 0 /100 WBCS
PLATELET # BLD AUTO: 312 THOUSANDS/UL (ref 149–390)
PMV BLD AUTO: 10.2 FL (ref 8.9–12.7)
POTASSIUM SERPL-SCNC: 3.6 MMOL/L (ref 3.5–5.3)
PROTHROMBIN TIME: 13.3 SECONDS (ref 12.3–15)
RBC # BLD AUTO: 6.02 MILLION/UL (ref 3.88–5.62)
SODIUM SERPL-SCNC: 134 MMOL/L (ref 135–147)
WBC # BLD AUTO: 13.69 THOUSAND/UL (ref 4.31–10.16)

## 2025-05-08 PROCEDURE — 80048 BASIC METABOLIC PNL TOTAL CA: CPT

## 2025-05-08 PROCEDURE — 99222 1ST HOSP IP/OBS MODERATE 55: CPT | Performed by: NURSE PRACTITIONER

## 2025-05-08 PROCEDURE — C1758 CATHETER, URETERAL: HCPCS | Performed by: SPECIALIST

## 2025-05-08 PROCEDURE — 74420 UROGRAPHY RTRGR +-KUB: CPT

## 2025-05-08 PROCEDURE — 99285 EMERGENCY DEPT VISIT HI MDM: CPT

## 2025-05-08 PROCEDURE — 85730 THROMBOPLASTIN TIME PARTIAL: CPT

## 2025-05-08 PROCEDURE — 85610 PROTHROMBIN TIME: CPT

## 2025-05-08 PROCEDURE — 88307 TISSUE EXAM BY PATHOLOGIST: CPT | Performed by: PATHOLOGY

## 2025-05-08 PROCEDURE — 88112 CYTOPATH CELL ENHANCE TECH: CPT | Performed by: PATHOLOGY

## 2025-05-08 PROCEDURE — 84145 PROCALCITONIN (PCT): CPT | Performed by: NURSE PRACTITIONER

## 2025-05-08 PROCEDURE — 36415 COLL VENOUS BLD VENIPUNCTURE: CPT

## 2025-05-08 PROCEDURE — 80076 HEPATIC FUNCTION PANEL: CPT | Performed by: NURSE PRACTITIONER

## 2025-05-08 PROCEDURE — 99283 EMERGENCY DEPT VISIT LOW MDM: CPT

## 2025-05-08 PROCEDURE — 85025 COMPLETE CBC W/AUTO DIFF WBC: CPT

## 2025-05-08 PROCEDURE — 88341 IMHCHEM/IMCYTCHM EA ADD ANTB: CPT | Performed by: PATHOLOGY

## 2025-05-08 PROCEDURE — 88342 IMHCHEM/IMCYTCHM 1ST ANTB: CPT | Performed by: PATHOLOGY

## 2025-05-08 RX ORDER — SODIUM CHLORIDE 9 MG/ML
INJECTION, SOLUTION INTRAVENOUS AS NEEDED
Status: DISCONTINUED | OUTPATIENT
Start: 2025-05-08 | End: 2025-05-08 | Stop reason: HOSPADM

## 2025-05-08 RX ORDER — CEFAZOLIN SODIUM 2 G/50ML
2000 SOLUTION INTRAVENOUS ONCE
Status: COMPLETED | OUTPATIENT
Start: 2025-05-08 | End: 2025-05-08

## 2025-05-08 RX ORDER — OXYCODONE AND ACETAMINOPHEN 5; 325 MG/1; MG/1
1 TABLET ORAL ONCE AS NEEDED
Status: COMPLETED | OUTPATIENT
Start: 2025-05-08 | End: 2025-05-08

## 2025-05-08 RX ORDER — ONDANSETRON 2 MG/ML
4 INJECTION INTRAMUSCULAR; INTRAVENOUS ONCE AS NEEDED
Status: DISCONTINUED | OUTPATIENT
Start: 2025-05-08 | End: 2025-05-08 | Stop reason: HOSPADM

## 2025-05-08 RX ORDER — ACETAMINOPHEN 325 MG/1
650 TABLET ORAL EVERY 6 HOURS PRN
Status: DISCONTINUED | OUTPATIENT
Start: 2025-05-08 | End: 2025-05-09 | Stop reason: HOSPADM

## 2025-05-08 RX ORDER — SODIUM CHLORIDE 9 MG/ML
75 INJECTION, SOLUTION INTRAVENOUS CONTINUOUS
Status: DISCONTINUED | OUTPATIENT
Start: 2025-05-08 | End: 2025-05-09 | Stop reason: HOSPADM

## 2025-05-08 RX ORDER — PROPOFOL 10 MG/ML
INJECTION, EMULSION INTRAVENOUS AS NEEDED
Status: DISCONTINUED | OUTPATIENT
Start: 2025-05-08 | End: 2025-05-08

## 2025-05-08 RX ORDER — HYDROMORPHONE HCL/PF 1 MG/ML
0.2 SYRINGE (ML) INJECTION
Status: DISCONTINUED | OUTPATIENT
Start: 2025-05-08 | End: 2025-05-09 | Stop reason: HOSPADM

## 2025-05-08 RX ORDER — CEFAZOLIN SODIUM 2 G/50ML
2000 SOLUTION INTRAVENOUS EVERY 8 HOURS
Status: DISCONTINUED | OUTPATIENT
Start: 2025-05-09 | End: 2025-05-09 | Stop reason: HOSPADM

## 2025-05-08 RX ORDER — SACCHAROMYCES BOULARDII 250 MG
250 CAPSULE ORAL 2 TIMES DAILY
Status: DISCONTINUED | OUTPATIENT
Start: 2025-05-09 | End: 2025-05-09 | Stop reason: HOSPADM

## 2025-05-08 RX ORDER — ONDANSETRON 2 MG/ML
INJECTION INTRAMUSCULAR; INTRAVENOUS AS NEEDED
Status: DISCONTINUED | OUTPATIENT
Start: 2025-05-08 | End: 2025-05-08

## 2025-05-08 RX ORDER — GLYCINE 1.5 G/100ML
SOLUTION IRRIGATION AS NEEDED
Status: DISCONTINUED | OUTPATIENT
Start: 2025-05-08 | End: 2025-05-08 | Stop reason: HOSPADM

## 2025-05-08 RX ORDER — HYDROMORPHONE HCL/PF 1 MG/ML
0.5 SYRINGE (ML) INJECTION
Status: DISCONTINUED | OUTPATIENT
Start: 2025-05-08 | End: 2025-05-08 | Stop reason: HOSPADM

## 2025-05-08 RX ORDER — LIDOCAINE HYDROCHLORIDE 10 MG/ML
INJECTION, SOLUTION EPIDURAL; INFILTRATION; INTRACAUDAL; PERINEURAL AS NEEDED
Status: DISCONTINUED | OUTPATIENT
Start: 2025-05-08 | End: 2025-05-08

## 2025-05-08 RX ORDER — SODIUM CHLORIDE, SODIUM LACTATE, POTASSIUM CHLORIDE, CALCIUM CHLORIDE 600; 310; 30; 20 MG/100ML; MG/100ML; MG/100ML; MG/100ML
INJECTION, SOLUTION INTRAVENOUS CONTINUOUS PRN
Status: DISCONTINUED | OUTPATIENT
Start: 2025-05-08 | End: 2025-05-08

## 2025-05-08 RX ORDER — SODIUM CHLORIDE 9 MG/ML
75 INJECTION, SOLUTION INTRAVENOUS CONTINUOUS
Status: DISCONTINUED | OUTPATIENT
Start: 2025-05-08 | End: 2025-05-08

## 2025-05-08 RX ORDER — SODIUM CHLORIDE, SODIUM LACTATE, POTASSIUM CHLORIDE, CALCIUM CHLORIDE 600; 310; 30; 20 MG/100ML; MG/100ML; MG/100ML; MG/100ML
100 INJECTION, SOLUTION INTRAVENOUS CONTINUOUS
Status: DISCONTINUED | OUTPATIENT
Start: 2025-05-08 | End: 2025-05-08

## 2025-05-08 RX ORDER — ONDANSETRON 2 MG/ML
4 INJECTION INTRAMUSCULAR; INTRAVENOUS EVERY 6 HOURS PRN
Status: DISCONTINUED | OUTPATIENT
Start: 2025-05-08 | End: 2025-05-09 | Stop reason: HOSPADM

## 2025-05-08 RX ORDER — FENTANYL CITRATE 50 UG/ML
INJECTION, SOLUTION INTRAMUSCULAR; INTRAVENOUS AS NEEDED
Status: DISCONTINUED | OUTPATIENT
Start: 2025-05-08 | End: 2025-05-08

## 2025-05-08 RX ORDER — FENTANYL CITRATE/PF 50 MCG/ML
25 SYRINGE (ML) INJECTION
Status: DISCONTINUED | OUTPATIENT
Start: 2025-05-08 | End: 2025-05-08 | Stop reason: HOSPADM

## 2025-05-08 RX ADMIN — FENTANYL CITRATE 25 MCG: 50 INJECTION, SOLUTION INTRAMUSCULAR; INTRAVENOUS at 16:29

## 2025-05-08 RX ADMIN — OXYCODONE HYDROCHLORIDE AND ACETAMINOPHEN 1 TABLET: 5; 325 TABLET ORAL at 17:44

## 2025-05-08 RX ADMIN — LIDOCAINE HYDROCHLORIDE 5 ML: 10 INJECTION, SOLUTION EPIDURAL; INFILTRATION; INTRACAUDAL; PERINEURAL at 16:29

## 2025-05-08 RX ADMIN — IOHEXOL 20 ML: 240 INJECTION, SOLUTION INTRATHECAL; INTRAVASCULAR; INTRAVENOUS; ORAL at 18:50

## 2025-05-08 RX ADMIN — PROPOFOL 150 MG: 10 INJECTION, EMULSION INTRAVENOUS at 16:29

## 2025-05-08 RX ADMIN — FENTANYL CITRATE 75 MCG: 50 INJECTION, SOLUTION INTRAMUSCULAR; INTRAVENOUS at 16:30

## 2025-05-08 RX ADMIN — ONDANSETRON 4 MG: 2 INJECTION INTRAMUSCULAR; INTRAVENOUS at 16:32

## 2025-05-08 RX ADMIN — SODIUM CHLORIDE, SODIUM LACTATE, POTASSIUM CHLORIDE, AND CALCIUM CHLORIDE: .6; .31; .03; .02 INJECTION, SOLUTION INTRAVENOUS at 16:23

## 2025-05-08 RX ADMIN — CEFAZOLIN SODIUM 2000 MG: 2 SOLUTION INTRAVENOUS at 16:22

## 2025-05-08 NOTE — PROGRESS NOTES
"Progress Note - Urology      Patient: Ramiro Singh   : 1955 Sex: male   MRN: [id number]     CSN: [id number]  Unit/Bed#: OR POOL     SUBJECTIVE:   Patient in surgical preop unit no change history physical aware risk of anesthesia infection bleeding postop catheter as well as additional procedures      Objective   Vitals: /91   Pulse 81   Temp 98.1 °F (36.7 °C) (Temporal)   Resp 18   Ht 5' 6\" (1.676 m)   Wt 95 kg (209 lb 7 oz)   SpO2 96%   BMI 33.80 kg/m²     No intake/output data recorded.      Physical Exam:   General Alert awake   Normocephalic atraumatic PERRLA  Lungs clear bilaterally  Cardiac normal S1 normal S2  Abdomen soft, flank pain  Extremities no edema      Lab Results: CBC:   Lab Results   Component Value Date    WBC 7.10 2025    HGB 12.6 2025    HCT 40.1 2025    MCV 64 (L) 2025     2025    RBC 6.26 (H) 2025    MCH 20.1 (L) 2025    MCHC 31.4 2025    RDW 16.2 (H) 2025    MPV 9.9 2025    NRBC 0 2024     CMP:   Lab Results   Component Value Date     2025    CO2 22 2025    BUN 20 2025    CREATININE 0.90 2025    CALCIUM 8.6 2025    EGFR 87 2025     Urinalysis:   Lab Results   Component Value Date    COLORU red 07/15/2024    CLARITYU clear 07/15/2024    GLUCOSEU - 07/15/2024    BLOODU - 07/15/2024     Urine Culture:   Lab Results   Component Value Date    URINECX No Growth <1000 cfu/mL 2024     PSA:   Lab Results   Component Value Date    PSA 1.811 2024         Assessment/ Plan:  Retrograde urethrogram internal urethrotomy Kenalog Injection possible TURBT laser bladder stone bilateral retrograde pyelogram          Gordon Cat MD  "

## 2025-05-08 NOTE — DISCHARGE INSTR - AVS FIRST PAGE
#1 no heavy straining or lifting above 10 pounds for 2 weeks    #2 call office fevers, chills, or worsening blood in the urine.    #3  Patient to be seen in office tomorrow at 10 AM for France removal if urine clear no straining aspirin nonsteroidals      Gordon Cat M.D. [facility] office  [address] [place] [address].  [place] [address]  [phone]  8:30 AM to 4:30 PM  Monday through Friday    [facility]  [address]  [address]  [place], [place] [id number] [phone]  1:00 to 5:00 PM  Wednesday

## 2025-05-08 NOTE — ANESTHESIA PREPROCEDURE EVALUATION
Procedure:  CYSTOSCOPY, DIRECT VISUAL INTERAL URETHROTOMY (DVIU) (Urethra)  TRANSURETHRAL RESECTION OF BLADDER TUMOR (TURBT), LASER BLADDER STONE, RETROGRADE (Bladder)    Relevant Problems   /RENAL   (+) BPH with obstruction/lower urinary tract symptoms        Physical Exam    Airway    Mallampati score: II  TM Distance: >3 FB  Neck ROM: full     Dental   No notable dental hx     Cardiovascular  Cardiovascular exam normal    Pulmonary  Pulmonary exam normal     Other Findings        Anesthesia Plan  ASA Score- 2     Anesthesia Type- general with ASA Monitors.         Additional Monitors:     Airway Plan: LMA.           Plan Factors-Exercise tolerance (METS): >4 METS.    Chart reviewed.   Existing labs reviewed. Patient summary reviewed.    Patient is a current smoker.      Obstructive sleep apnea risk education given perioperatively.        Induction- intravenous.    Postoperative Plan- Plan for postoperative opioid use.     Perioperative Resuscitation Plan - Level 1 - Full Code.       Informed Consent- Anesthetic plan and risks discussed with patient.  I personally reviewed this patient with the CRNA. Discussed and agreed on the Anesthesia Plan with the CRNA..      NPO Status:  Vitals Value Taken Time   Date of last liquid 05/07/25 05/08/25 1341   Time of last liquid 2200 05/08/25 1341   Date of last solid 05/07/25 05/08/25 1341   Time of last solid 2200 05/08/25 1341

## 2025-05-08 NOTE — ANESTHESIA POSTPROCEDURE EVALUATION
Post-Op Assessment Note    CV Status:  Stable    Pain management: adequate       Mental Status:  Alert and awake   Hydration Status:  Euvolemic   PONV Controlled:  Controlled   Airway Patency:  Patent     Post Op Vitals Reviewed: Yes    No anethesia notable event occurred.    Staff: Anesthesiologist           Last Filed PACU Vitals:  Vitals Value Taken Time   Temp 97    Pulse 76    /89    Resp 14    SpO2 99

## 2025-05-08 NOTE — OP NOTE
OPERATIVE REPORT  PATIENT NAME: Ramiro Singh    :  1955  MRN: [id number]  Pt Location: [facility] OR ROOM 04    SURGERY DATE: 2025    Surgeons and Role:     * Gordon Cat MD - Primary    Preop Diagnosis:  Postinfective urethral stricture, not elsewhere classified, male, meatal [N35.111]  Gross hematuria [R31.0]  Calculus in bladder [N21.0]    Post-Op Diagnosis Codes:     * Postinfective urethral stricture, not elsewhere classified, male, meatal [N35.111]     * Gross hematuria [R31.0]     * Calculus in bladder [N21.0]    Procedure(s):  CYSTOSCOPY. DIRECT VISUAL INTERAL URETHROTOMY (DVIU)  FULGERATION OF 1CM BLADDER LESION WITH BIOPSY. BILATERAL RETROGRADE. FULGERATION PROSTATE BLEEDING    Specimen(s):  ID Type Source Tests Collected by Time Destination   1 : urine cytology Urine Urine, Cystoscopic CYTOLOGY, URINE Gordon Cat MD 2025 1644    2 : RIGHT BLADDER NECK TUMOR Tissue Urinary Bladder TISSUE EXAM Gordon Cat MD 2025 1649        Estimated Blood Loss:   Minimal    Drains:  Urethral Catheter Latex 22 Fr. (Active)   Output (mL) 50 mL 25 1712   Number of days: 0       Anesthesia Type:   General    Operative Indications:  Postinfective urethral stricture, not elsewhere classified, male, meatal [N35.111]  Gross hematuria [R31.0]  Calculus in bladder [N21.0]  70-year-old male recently transferring from [facility] urology to my office history of prostatitis smoking history hematuria undergoing CAT scan confirming bladder stone 1 cm found on office flex cystoscopy to have fossa navicularis stricture patient refused dilation and now admitted to [facility] for exam under anesthesia possible DVIU cystoscopy removal bladder stone retrograde pyelogram aware risk of anesthesia infection bleeding additional Yannes procedures    Operative Findings:  #1 fossa navicularis stricture DVIU done  Number two 3.0 cm hypervascular prostatic urethra aggressive fulguration for bleeding  3   1 cm bladder stone  removed  #4 bilateral retrogrades normal  #5 suspicious lesion right bladder neck biopsy fulguration Path pending  #622 Maori three-way [place] cath inserted left the bag drainage moderate hematuria noted      Complications:   None    Procedure and Technique:  Patient identified in the holding area consent obtained in the office reviewed and wished to proceed with procedure placed in the OR suite after anesthesia was induced placed in lithotomy position draped and prepped in a standard fashion timeout performed 22 Maori cystoscope was placed into the meatus with immediate stricture noted in the fossa navicularis 0.038 wire passed down into the proximal urethra and in the fluoroscopic control into the prostatic urethra and bladder the wire was backloaded off the urethrotome was placed and the stricture was opened at 12:00 the urethrotome was then placed down into the membranous urethra with no other strictures noted and removed the cystoscope was then replaced anterior urethra confirmed small middle lobe trilobar hypervascular prostatic urethra with immediate bleeding noted when scope inserted into the bladder urine was collected cytology review of the bladder with 30 and 70 degree lens there was a 1 cm suspicious polypoid lesion in the right prostatic urethra bladder neck 5 Maori open-ended catheter placed into the right orifice right retrograde pyelogram confirmed normal filling and drainage left retrograde pyelogram confirmed normal filling and drainage 1 cm bladder stone was removed but not sent to pathology the biopsy forceps were placed in the right bladder neck lesion was excised completely and sent to path the entire base was fulgurated with the Bugbee the prostatic urethra was cauterized with the Bugbee scope removed 22 Maori three-way three-way [place] cath inserted left the bed drainage time dictation moderate hematuria noted patient to have procedure awakened taken to recovery room stable condition   I  was present for the entire procedure.    Patient Disposition:  PACU              SIGNATURE: Gordon Cat MD  DATE: May 8, 2025  TIME: 5:25 PM

## 2025-05-09 VITALS
DIASTOLIC BLOOD PRESSURE: 90 MMHG | HEART RATE: 86 BPM | RESPIRATION RATE: 18 BRPM | SYSTOLIC BLOOD PRESSURE: 143 MMHG | HEIGHT: 66 IN | BODY MASS INDEX: 33.52 KG/M2 | OXYGEN SATURATION: 96 % | TEMPERATURE: 98 F | WEIGHT: 208.56 LBS

## 2025-05-09 LAB
ALBUMIN SERPL BCG-MCNC: 3.7 G/DL (ref 3.5–5)
ALP SERPL-CCNC: 50 U/L (ref 34–104)
ALT SERPL W P-5'-P-CCNC: 43 U/L (ref 7–52)
ANION GAP SERPL CALCULATED.3IONS-SCNC: 9 MMOL/L (ref 4–13)
AST SERPL W P-5'-P-CCNC: 22 U/L (ref 13–39)
BILIRUB DIRECT SERPL-MCNC: 0.06 MG/DL (ref 0–0.2)
BILIRUB SERPL-MCNC: 0.5 MG/DL (ref 0.2–1)
BUN SERPL-MCNC: 13 MG/DL (ref 5–25)
CALCIUM SERPL-MCNC: 8.2 MG/DL (ref 8.4–10.2)
CHLORIDE SERPL-SCNC: 104 MMOL/L (ref 96–108)
CO2 SERPL-SCNC: 22 MMOL/L (ref 21–32)
CREAT SERPL-MCNC: 0.77 MG/DL (ref 0.6–1.3)
ERYTHROCYTE [DISTWIDTH] IN BLOOD BY AUTOMATED COUNT: 16 % (ref 11.6–15.1)
GFR SERPL CREATININE-BSD FRML MDRD: 93 ML/MIN/1.73SQ M
GLUCOSE SERPL-MCNC: 106 MG/DL (ref 65–140)
HCT VFR BLD AUTO: 36.1 % (ref 36.5–49.3)
HGB BLD-MCNC: 11.5 G/DL (ref 12–17)
LACTATE SERPL-SCNC: 0.7 MMOL/L (ref 0.5–2)
MAGNESIUM SERPL-MCNC: 1.8 MG/DL (ref 1.9–2.7)
MCH RBC QN AUTO: 20.5 PG (ref 26.8–34.3)
MCHC RBC AUTO-ENTMCNC: 31.9 G/DL (ref 31.4–37.4)
MCV RBC AUTO: 64 FL (ref 82–98)
PLATELET # BLD AUTO: 311 THOUSANDS/UL (ref 149–390)
PMV BLD AUTO: 10.4 FL (ref 8.9–12.7)
POTASSIUM SERPL-SCNC: 3.8 MMOL/L (ref 3.5–5.3)
PROCALCITONIN SERPL-MCNC: 0.09 NG/ML
PROT SERPL-MCNC: 8.6 G/DL (ref 6.4–8.4)
RBC # BLD AUTO: 5.61 MILLION/UL (ref 3.88–5.62)
SODIUM SERPL-SCNC: 135 MMOL/L (ref 135–147)
WBC # BLD AUTO: 12.18 THOUSAND/UL (ref 4.31–10.16)

## 2025-05-09 PROCEDURE — 87040 BLOOD CULTURE FOR BACTERIA: CPT | Performed by: NURSE PRACTITIONER

## 2025-05-09 PROCEDURE — 83605 ASSAY OF LACTIC ACID: CPT | Performed by: NURSE PRACTITIONER

## 2025-05-09 PROCEDURE — 99239 HOSP IP/OBS DSCHRG MGMT >30: CPT | Performed by: INTERNAL MEDICINE

## 2025-05-09 PROCEDURE — 80048 BASIC METABOLIC PNL TOTAL CA: CPT | Performed by: NURSE PRACTITIONER

## 2025-05-09 PROCEDURE — 83735 ASSAY OF MAGNESIUM: CPT | Performed by: NURSE PRACTITIONER

## 2025-05-09 PROCEDURE — 85027 COMPLETE CBC AUTOMATED: CPT | Performed by: NURSE PRACTITIONER

## 2025-05-09 RX ORDER — LANOLIN ALCOHOL/MO/W.PET/CERES
400 CREAM (GRAM) TOPICAL 2 TIMES DAILY
Status: DISCONTINUED | OUTPATIENT
Start: 2025-05-09 | End: 2025-05-09 | Stop reason: HOSPADM

## 2025-05-09 RX ADMIN — CEFAZOLIN SODIUM 2000 MG: 2 SOLUTION INTRAVENOUS at 00:39

## 2025-05-09 RX ADMIN — Medication 250 MG: at 17:13

## 2025-05-09 RX ADMIN — CEFAZOLIN SODIUM 2000 MG: 2 SOLUTION INTRAVENOUS at 08:37

## 2025-05-09 RX ADMIN — CEFAZOLIN SODIUM 2000 MG: 2 SOLUTION INTRAVENOUS at 16:44

## 2025-05-09 RX ADMIN — HYDROMORPHONE HYDROCHLORIDE 0.2 MG: 1 INJECTION, SOLUTION INTRAMUSCULAR; INTRAVENOUS; SUBCUTANEOUS at 16:46

## 2025-05-09 RX ADMIN — Medication 250 MG: at 08:38

## 2025-05-09 RX ADMIN — Medication 400 MG: at 17:13

## 2025-05-09 RX ADMIN — ACETAMINOPHEN 650 MG: 325 TABLET, FILM COATED ORAL at 08:38

## 2025-05-09 RX ADMIN — Medication 400 MG: at 09:37

## 2025-05-09 RX ADMIN — SODIUM CHLORIDE 75 ML/HR: 0.9 INJECTION, SOLUTION INTRAVENOUS at 00:39

## 2025-05-09 NOTE — PLAN OF CARE
Problem: PAIN - ADULT  Goal: Verbalizes/displays adequate comfort level or baseline comfort level  Description: Interventions:- Encourage patient to monitor pain and request assistance- Assess pain using appropriate pain scale- Administer analgesics based on type and severity of pain and evaluate response- Implement non-pharmacological measures as appropriate and evaluate response- Consider cultural and social influences on pain and pain management- Notify physician/advanced practitioner if interventions unsuccessful or patient reports new pain  Outcome: Progressing     Problem: INFECTION - ADULT  Goal: Absence or prevention of progression during hospitalization  Description: INTERVENTIONS:- Assess and monitor for signs and symptoms of infection- Monitor lab/diagnostic results- Monitor all insertion sites, i.e. indwelling lines, tubes, and drains- Monitor endotracheal if appropriate and nasal secretions for changes in amount and color- [place] appropriate cooling/warming therapies per order- Administer medications as ordered- Instruct and encourage patient and family to use good hand hygiene technique- Identify and instruct in appropriate isolation precautions for identified infection/condition  Outcome: Progressing  Goal: Absence of fever/infection during neutropenic period  Description: INTERVENTIONS:- Monitor WBC  Outcome: Progressing     Problem: SAFETY ADULT  Goal: Patient will remain free of falls  Description: INTERVENTIONS:- Educate patient/family on patient safety including physical limitations- Instruct patient to call for assistance with activity - Consult OT/PT to assist with strengthening/mobility - Keep Call bell within reach- Keep bed low and locked with side rails adjusted as appropriate- Keep care items and personal belongings within reach- Initiate and maintain comfort rounds- Make Fall Risk Sign visible to staff- Offer Toileting every 2 Hours, in advance of need- Initiate/Maintain bed alarm-  Obtain necessary fall risk management equipment: such as chair alarm when OOB- Apply yellow socks and bracelet for high fall risk patients- Consider moving patient to room near nurses station  Outcome: Progressing  Goal: Maintain or return to baseline ADL function  Description: INTERVENTIONS:-  Assess patient's ability to carry out ADLs; assess patient's baseline for ADL function and identify physical deficits which impact ability to perform ADLs (bathing, care of mouth/teeth, toileting, grooming, dressing, etc.)- Assess/evaluate cause of self-care deficits - Assess range of motion- Assess patient's mobility; develop plan if impaired- Assess patient's need for assistive devices and provide as appropriate- Encourage maximum independence but intervene and supervise when necessary- Involve family in performance of ADLs- Assess for home care needs following discharge - Consider OT consult to assist with ADL evaluation and planning for discharge- Provide patient education as appropriate  Outcome: Progressing  Goal: Maintains/Returns to pre admission functional level  Description: INTERVENTIONS:- Perform AM-PAC 6 Click Basic Mobility/ Daily Activity assessment daily.- Set and communicate daily mobility goal to care team and patient/family/caregiver. - Collaborate with rehabilitation services on mobility goals if consulted- Perform Range of Motion 2 times a day.- Reposition patient every 2 hours.- Dangle patient 2 times a day- Stand patient 3 times a day- Ambulate patient 3 times a day- Out of bed to chair 2 times a day - Out of bed for meals 3 times a day- Out of bed for toileting- Record patient progress and toleration of activity level   Outcome: Progressing     Problem: DISCHARGE PLANNING  Goal: Discharge to home or other facility with appropriate resources  Description: INTERVENTIONS:- Identify barriers to discharge w/patient and caregiver- Arrange for needed discharge resources and transportation as appropriate-  Identify discharge learning needs (meds, wound care, etc.)- Arrange for interpretive services to assist at discharge as needed- Refer to Case Management Department for coordinating discharge planning if the patient needs post-hospital services based on physician/advanced practitioner order or complex needs related to functional status, cognitive ability, or social support system  Outcome: Progressing     Problem: Knowledge Deficit  Goal: Patient/family/caregiver demonstrates understanding of disease process, treatment plan, medications, and discharge instructions  Description: Complete learning assessment and assess knowledge base.Interventions:- Provide teaching at level of understanding- Provide teaching via preferred learning methods  Outcome: Progressing     Problem: GENITOURINARY - ADULT  Goal: Maintains or returns to baseline urinary function  Description: INTERVENTIONS:- Assess urinary function- Encourage oral fluids to ensure adequate hydration if ordered- Administer IV fluids as ordered to ensure adequate hydration- Administer ordered medications as needed- Offer frequent toileting- Follow urinary retention protocol if ordered  Outcome: Progressing  Goal: Absence of urinary retention  Description: INTERVENTIONS:- Assess patient’s ability to void and empty bladder- Monitor I/O- Bladder scan as needed- Discuss with physician/AP medications to alleviate retention as needed- Discuss catheterization for long term situations as appropriate  Outcome: Progressing  Goal: Urinary catheter remains patent  Description: INTERVENTIONS:- Assess patency of urinary catheter- If patient has a chronic holman, consider changing catheter if non-functioning- Follow guidelines for intermittent irrigation of non-functioning urinary catheter  Outcome: Progressing     Problem: METABOLIC, FLUID AND ELECTROLYTES - ADULT  Goal: Electrolytes maintained within normal limits  Description: INTERVENTIONS:- Monitor labs and assess patient for  signs and symptoms of electrolyte imbalances- Administer electrolyte replacement as ordered- Monitor response to electrolyte replacements, including repeat lab results as appropriate- Instruct patient on fluid and nutrition as appropriate  Outcome: Progressing  Goal: Fluid balance maintained  Description: INTERVENTIONS:- Monitor labs - Monitor I/O and WT- Instruct patient on fluid and nutrition as appropriate- Assess for signs & symptoms of volume excess or deficit  Outcome: Progressing  Goal: Glucose maintained within target range  Description: INTERVENTIONS:- Monitor Blood Glucose as ordered- Assess for signs and symptoms of hyperglycemia and hypoglycemia- Administer ordered medications to maintain glucose within target range- Assess nutritional intake and initiate nutrition service referral as needed  Outcome: Progressing

## 2025-05-09 NOTE — H&P
H&P - Hospitalist   Name: Ramiro Singh 70 y.o. male I MRN: [id number]  Unit/Bed#: NAV I Date of Admission: 5/8/2025   Date of Service: 5/8/2025 I Hospital Day: 0     Assessment & Plan  Gross hematuria  Patient presents with gross hematuria.  States had cystoscopy today, was released from hospital a few hours ago prior to ED arrival, started with bladder pain/urge to urinate, so he tried to push it and saw blood coming out around the catheter not into the catheter.  Denies fever chills.  Chart reviewed.  Patient is status post cystoscopy, direct visual interal urethrotomy, fulguration of 1cm bladder lesion with biopsy, bilateral retrograde,fulguration prostate bleeding today.  Patient was discharged home with three-way catheter. Urine was bloody when left hospital per patient. Patient reports history of prostatitis 6 months ago with hematuria and  history of BPH with TURP 12 years ago.  Catheter was irrigated in ED, followed by CBI.  Will continue.  Blood work showed unremarkable BMP.  WBC 13.69, no bands, afebrile.  Patient tachycardic on arrival, meeting 2 SIRS.  See below.  Hemoglobin stable.  Repeat CBC in the morning.  Check UA.  Start antibiotic empirically, see below.  N.p.o. after midnight  IV hydration  Pain control  Consult urology    SIRS (systemic inflammatory response syndrome) (HCC)  POA, as evidenced by leukocytosis, tachycardia, suspect reactive, need to rule out urinary source of infection.   Check lactic acid, procalcitonin, blood culture  Check UA  Start IV Ancef empirically  Repeat CBC, procalcitonin in the morning  Asthma  Noted history  No wheezing on auscultation  Obesity (BMI 30.0-34.9)  Diet and lifestyle modification  Current smoker on some days  Smoking cessation.  Patient declined nicotine patch.  Hyponatremia  Sodium 134.  Mild.  Repeat lab in the morning      VTE Pharmacologic Prophylaxis: VTE Score: 3 Moderate Risk (Score 3-4) - Pharmacological DVT Prophylaxis Contraindicated.  Sequential Compression Devices Ordered.  Code Status: Level 1 - Full Code   Discussion with family:  no.     Anticipated Length of Stay: Patient will be admitted on an observation basis with an anticipated length of stay of less than 2 midnights secondary to gross hematuria.    History of Present Illness   Chief Complaint: Hematuria    Ramiro Singh is a 70 y.o. male with a PMH of prostatitis, BPH with TURP 12 years ago, asthma, obesity who presents with gross hematuria.  Patient states he had cystoscopy today, was released from hospital a few hours ago prior to ED arrival, started with bladder pain/urge to urinate, so he tried to push it and saw blood coming out around the catheter not into the catheter.  Denies fever chills.  Reports took an oxycodone at home for pain.  Patient denies chest pain dizziness SOB nausea vomiting.  Reports mild headache at home.  Patient reports mild bladder pain currently.  Denies taking aspirin Plavix or blood thinner at home.  No other complaints.          Review of Systems   Genitourinary:  Positive for hematuria.        Bladder pain   All other systems reviewed and are negative.      Historical Information   Past Medical History:   Diagnosis Date    Asthma     Kidney stone     Prostatitis     Urinary tract infection      Past Surgical History:   Procedure Laterality Date    PROSTATE SURGERY      TURP 12 years ago    WISDOM TOOTH EXTRACTION       Social History     Tobacco Use    Smoking status: Some Days     Types: Cigars     Passive exposure: Past    Smokeless tobacco: Never    Tobacco comments:     Cigar every now and then   Vaping Use    Vaping status: Never Used   Substance and Sexual Activity    Alcohol use: Yes     Alcohol/week: 1.0 standard drink of alcohol     Types: 1 Cans of beer per week     Comment: a beer or two once a week    Drug use: Never    Sexual activity: Not on file     E-Cigarette/Vaping    E-Cigarette Use Never User      E-Cigarette/Vaping Substances     Nicotine No     THC No     CBD No     Flavoring No     Other No     Unknown No      Family history non-contributory  Social History:  Marital Status: /Civil Union   Occupation: Unclear  Patient Pre-hospital Living Situation: Home  Patient Pre-hospital Level of Mobility: walks  Patient Pre-hospital Diet Restrictions: Regular    Meds/Allergies   I have reviewed home medications with patient personally.  Prior to Admission medications    Medication Sig Start Date End Date Taking? Authorizing Provider   loratadine-pseudoephedrine (CLARITIN-D 12-HOUR) 5-120 mg per tablet Take 1 tablet by mouth 2 (two) times a day    Historical Provider, MD   finasteride (PROSCAR) 5 mg tablet Take 1 tablet (5 mg total) by mouth daily 8/2/24 5/8/25  Anmol Rizvi MD     No Known Allergies    Objective :  Temp:  [97.3 °F (36.3 °C)-98.1 °F (36.7 °C)] 97.6 °F (36.4 °C)  HR:  [] 88  BP: (127-173)/(78-92) 135/79  Resp:  [13-18] 14  SpO2:  [96 %-98 %] 96 %  O2 Device: None (Room air)    Physical Exam  Vitals and nursing note reviewed.   Constitutional:       Appearance: He is well-developed. He is obese.   HENT:      Head: Normocephalic and atraumatic.   Neck:      Thyroid: No thyromegaly.      Vascular: No JVD.      Trachea: No tracheal deviation.   Cardiovascular:      Rate and Rhythm: Normal rate and regular rhythm.      Heart sounds: Normal heart sounds.   Pulmonary:      Effort: Pulmonary effort is normal. No respiratory distress.      Breath sounds: No wheezing or rales.      Comments: Breath sounds diminished in lower lobes, on room air, respiration easy  Abdominal:      General: Bowel sounds are normal. There is no distension.      Palpations: Abdomen is soft.      Tenderness: There is no abdominal tenderness. There is no guarding.   Genitourinary:     Comments: CBI ongoing  Musculoskeletal:         General: Normal range of motion.      Cervical back: Neck supple.      Right lower leg: No edema.      Left lower leg: No  "edema.   Skin:     General: Skin is warm and dry.   Neurological:      General: No focal deficit present.      Mental Status: He is alert and oriented to person, place, and time.   Psychiatric:         Mood and Affect: Mood normal.         Judgment: Judgment normal.      Comments: Patient tearful during exam, emotional.          Lines/Drains:  Lines/Drains/Airways       Active Status       Name Placement date Placement time Site Days    Urethral Catheter Latex 22 Fr. 05/08/25  1658  Latex  less than 1                  Urinary Catheter:  Goal for removal:  Per urology               Lab Results: I have reviewed the following results:  Results from last 7 days   Lab Units 05/08/25  2303   WBC Thousand/uL 13.69*   HEMOGLOBIN g/dL 12.3   HEMATOCRIT % 38.7   PLATELETS Thousands/uL 312   SEGS PCT % 78*   LYMPHO PCT % 12*   MONO PCT % 8   EOS PCT % 1     Results from last 7 days   Lab Units 05/08/25  2303   SODIUM mmol/L 134*   POTASSIUM mmol/L 3.6   CHLORIDE mmol/L 102   CO2 mmol/L 23   BUN mg/dL 15   CREATININE mg/dL 0.88   ANION GAP mmol/L 9   CALCIUM mg/dL 8.5   GLUCOSE RANDOM mg/dL 96     Results from last 7 days   Lab Units 05/08/25  2303   INR  0.97         No results found for: \"HGBA1C\"        Imaging Results Review: No pertinent imaging studies reviewed.  Other Study Results Review: No additional pertinent studies reviewed.    Administrative Statements       ** Please Note: This note has been constructed using a voice recognition system. **    "

## 2025-05-09 NOTE — ASSESSMENT & PLAN NOTE
Patient presents with gross hematuria.  States had cystoscopy today, was released from hospital a few hours ago prior to ED arrival, started with bladder pain/urge to urinate, so he tried to push it and saw blood coming out around the catheter not into the catheter.  Denies fever chills.  Chart reviewed.  Patient is status post cystoscopy, direct visual interal urethrotomy, fulguration of 1cm bladder lesion with biopsy, bilateral retrograde,fulguration prostate bleeding today.  Patient was discharged home with three-way catheter. Urine was bloody when left hospital per patient. Patient reports history of prostatitis 6 months ago with hematuria and  history of BPH with TURP 12 years ago.  Catheter was irrigated in ED, followed by CBI.  Will continue.  Blood work showed unremarkable BMP.  WBC 13.69, no bands, afebrile.  Patient tachycardic on arrival, meeting 2 SIRS.  See below.  Hemoglobin stable.  Repeat CBC in the morning.  Check UA.  Start antibiotic empirically, see below.  N.p.o. after midnight  IV hydration  Pain control  Consult urology

## 2025-05-09 NOTE — ASSESSMENT & PLAN NOTE
POA, as evidenced by leukocytosis, tachycardia, suspect reactive, need to rule out urinary source of infection.   Lactic acid level was normal  White count is improving  Patient was on IV Ancef which can be discontinued

## 2025-05-09 NOTE — ASSESSMENT & PLAN NOTE
Patient presents with gross hematuria.  States had cystoscopy for admission was released from hospital a few hours ago prior to ED arrival, started with bladder pain/urge to urinate, so he tried to push it and saw blood coming out around the catheter not into the catheter.  Denies fever chills.  Chart reviewed.  Patient is status post cystoscopy, direct visual interal urethrotomy, fulguration of 1cm bladder lesion with biopsy, bilateral retrograde,fulguration prostate bleeding today.  Patient was discharged home with three-way catheter. Urine was bloody when left hospital per patient. Patient reports history of prostatitis 6 months ago with hematuria and  history of BPH with TURP 12 years ago.  Catheter was irrigated in ED, followed by CBI.  Will continue.  Blood work showed unremarkable BMP.  WBC 13.69, no bands, afebrile.  Patient tachycardic on arrival, meeting 2 SIRS.  See below.  Hemoglobin stable.  Hematuria resolved with CBI which was later discontinued.  Patient noted to have some bleeding from the penis expected due to stricture dilatation as per urology.  Patient to be discharged home with an outpatient follow-up with urology

## 2025-05-09 NOTE — ED PROVIDER NOTES
"Time reflects when diagnosis was documented in both MDM as applicable and the Disposition within this note       Time User Action Codes Description Comment    5/8/2025 10:28 PM Sotero Fonseca Add [T83.091A] Complication, blocked [place] catheter, initial encounter (HCC)           ED Disposition       ED Disposition   Admit    Condition   Stable    Date/Time   Thu May 8, 2025 10:28 PM    Comment   Case was discussed with FARZAD and the patient's admission status was agreed to be Admission Status: observation status to the service of Dr. Oseguera.               Assessment & Plan       Medical Decision Making      Patient is a 70-year-old male presenting for postoperative complication.  Patient reports he had a cystoscopy today with urology to remove a bladder stone and also to \"burned part of my prostate.\"  Patient left this procedure with a [place] catheter in place.  Patient states that he is having some discomfort with the [place] and feels that the urine is leaking around the [place] not going through it.  He feels like it may be clogged.  Vital signs are stable  Patient's [place] bag with arnav hematuria.  Suspect likely blood clot is blocking flow of urine.  Nursing was able to and flush the catheter and now seems to be functioning appropriately.  Hematuria has cleared somewhat.  Will observe in the emergency department, if recurrence of clotting, will start continuous bladder irrigation and admit to the hospital.  Patient with persistent clotting after period of observation, will start CBI and admit to the hospital.      ED Course as of 05/08/25 2230   Thu May 08, 2025   2117 Hand flushed by nursing x2, small clots removed, urine clearing. Will encourage PO to see how it drains from here, if continues to get blocked can CBI       Medications - No data to display    ED Risk Strat Scores                    No data recorded        SBIRT 20yo+      Flowsheet Row Most Recent Value   Initial Alcohol Screen: US AUDIT-C     1. How " often do you have a drink containing alcohol? 0 Filed at: 05/08/2025 2027   2. How many drinks containing alcohol do you have on a typical day you are drinking?  0 Filed at: 05/08/2025 2027   3a. Male UNDER 65: How often do you have five or more drinks on one occasion? 0 Filed at: 05/08/2025 2027   3b. FEMALE Any Age, or MALE 65+: How often do you have 4 or more drinks on one occassion? 0 Filed at: 05/08/2025 2027   Audit-C Score 0 Filed at: 05/08/2025 2027   KO: How many times in the past year have you...    Used an illegal drug or used a prescription medication for non-medical reasons? Never Filed at: 05/08/2025 2027                            History of Present Illness       Chief Complaint   Patient presents with    Post-op Problem     Had cystoscopy today and now having pain with catheter and states it's draining around it and not into it       Past Medical History:   Diagnosis Date    Asthma     Kidney stone     Urinary tract infection       Past Surgical History:   Procedure Laterality Date    PROSTATE SURGERY      WISDOM TOOTH EXTRACTION        History reviewed. No pertinent family history.   Social History     Tobacco Use    Smoking status: Some Days     Types: Cigars     Passive exposure: Past    Smokeless tobacco: Never    Tobacco comments:     Cigar every now and then   Vaping Use    Vaping status: Never Used   Substance Use Topics    Alcohol use: Yes     Alcohol/week: 1.0 standard drink of alcohol     Types: 1 Cans of beer per week     Comment: a beer or two once a week    Drug use: Never      E-Cigarette/Vaping    E-Cigarette Use Never User       E-Cigarette/Vaping Substances    Nicotine No     THC No     CBD No     Flavoring No     Other No     Unknown No       I have reviewed and agree with the history as documented.     HPI  See MDM  Review of Systems   Constitutional:  Negative for chills and fever.   HENT:  Negative for ear pain and sore throat.    Eyes:  Negative for pain and visual  disturbance.   Respiratory:  Negative for cough and shortness of breath.    Cardiovascular:  Negative for chest pain and palpitations.   Gastrointestinal:  Negative for abdominal pain and vomiting.   Genitourinary:  Positive for hematuria. Negative for dysuria.   Musculoskeletal:  Negative for arthralgias and back pain.   Skin:  Negative for color change and rash.   Neurological:  Negative for seizures and syncope.   All other systems reviewed and are negative.          Objective       ED Triage Vitals   Temperature Pulse Blood Pressure Respirations SpO2 Patient Position - Orthostatic VS   05/08/25 2027 05/08/25 2027 05/08/25 2029 05/08/25 2027 05/08/25 2027 --   97.6 °F (36.4 °C) (!) 107 (!) 173/92 18 96 %       Temp src Heart Rate Source BP Location FiO2 (%) Pain Score    -- -- -- -- 05/08/25 2027        10 - Worst Possible Pain      Vitals      Date and Time Temp Pulse SpO2 Resp BP Pain Score FACES Pain Rating User   05/08/25 2029 -- -- -- -- 173/92 -- -- MANUELA   05/08/25 2027 97.6 °F (36.4 °C) 107 96 % 18 -- 10 - Worst Possible Pain -- MANUELA            Physical Exam  Vitals and nursing note reviewed.   Constitutional:       General: He is not in acute distress.     Appearance: He is well-developed.   HENT:      Head: Normocephalic and atraumatic.      Right Ear: External ear normal.      Left Ear: External ear normal.   Eyes:      Extraocular Movements: Extraocular movements intact.   Cardiovascular:      Rate and Rhythm: Normal rate and regular rhythm.      Pulses: Normal pulses.      Heart sounds: No murmur heard.  Pulmonary:      Effort: Pulmonary effort is normal. No respiratory distress.   Abdominal:      Palpations: Abdomen is soft.      Tenderness: There is no abdominal tenderness.   Genitourinary:     Comments: [place] catheter in place with arnav hematuria  Musculoskeletal:         General: No swelling.      Cervical back: Neck supple.   Skin:     General: Skin is warm and dry.      Capillary Refill: Capillary  refill takes less than 2 seconds.   Neurological:      General: No focal deficit present.      Mental Status: He is alert.   Psychiatric:         Mood and Affect: Mood normal.         Results Reviewed       Procedure Component Value Units Date/Time    CBC and differential [[id number]]     Lab Status: No result Specimen: Blood     Basic metabolic panel [[id number]]     Lab Status: No result Specimen: Blood     Protime-INR [[id number]]     Lab Status: No result Specimen: Blood     APTT [[id number]]     Lab Status: No result Specimen: Blood             No orders to display       Procedures    ED Medication and Procedure Management   Prior to Admission Medications   Prescriptions Last Dose Informant Patient Reported? Taking?   loratadine-pseudoephedrine (CLARITIN-D 12-HOUR) 5-120 mg per tablet   Yes No   Sig: Take 1 tablet by mouth 2 (two) times a day      Facility-Administered Medications: None     Patient's Medications   Discharge Prescriptions    No medications on file     No discharge procedures on file.  ED SEPSIS DOCUMENTATION   Time reflects when diagnosis was documented in both MDM as applicable and the Disposition within this note       Time User Action Codes Description Comment    5/8/2025 10:28 PM Sotero Fonseca Add [T83.091A] Complication, blocked [place] catheter, initial encounter ([facility])                  Sotero Fonseca MD  05/08/25 5101

## 2025-05-09 NOTE — ASSESSMENT & PLAN NOTE
POA, as evidenced by leukocytosis, tachycardia, suspect reactive, need to rule out urinary source of infection.   Check lactic acid, procalcitonin, blood culture  Check UA  Start IV Ancef empirically  Repeat CBC, procalcitonin in the morning

## 2025-05-09 NOTE — DISCHARGE SUMMARY
Discharge Summary - Hospitalist   Name: Ramiro Singh 70 y.o. male I MRN: [id number]  Unit/Bed#: 2 [address] Date of Admission: 5/8/2025   Date of Service: 5/9/2025 I Hospital Day: 0     Assessment & Plan  Gross hematuria  Patient presents with gross hematuria.  States had cystoscopy for admission was released from hospital a few hours ago prior to ED arrival, started with bladder pain/urge to urinate, so he tried to push it and saw blood coming out around the catheter not into the catheter.  Denies fever chills.  Chart reviewed.  Patient is status post cystoscopy, direct visual interal urethrotomy, fulguration of 1cm bladder lesion with biopsy, bilateral retrograde,fulguration prostate bleeding today.  Patient was discharged home with three-way catheter. Urine was bloody when left hospital per patient. Patient reports history of prostatitis 6 months ago with hematuria and  history of BPH with TURP 12 years ago.  Catheter was irrigated in ED, followed by CBI.  Will continue.  Blood work showed unremarkable BMP.  WBC 13.69, no bands, afebrile.  Patient tachycardic on arrival, meeting 2 SIRS.  See below.  Hemoglobin stable.  Hematuria resolved with CBI which was later discontinued.  Patient noted to have some bleeding from the penis expected due to stricture dilatation as per urology.  Patient to be discharged home with an outpatient follow-up with urology    SIRS (systemic inflammatory response syndrome) (HCC)  POA, as evidenced by leukocytosis, tachycardia, suspect reactive, need to rule out urinary source of infection.   Lactic acid level was normal  White count is improving  Patient was on IV Ancef which can be discontinued  Asthma  Noted history  No evidence of exacerbation  Obesity (BMI 30.0-34.9)  Diet and lifestyle modification  Current smoker on some days  Smoking cessation.  Patient declined nicotine patch.  Hyponatremia  Resolved     Medical Problems       Resolved Problems  Date Reviewed: 11/5/2024  "  None       Discharging Physician / Practitioner: Kay Johnson MD  PCP: No primary care provider on file.  Admission Date:   Admission Orders (From admission, onward)       Ordered        05/08/25 2318  Place in Observation  Once                          Discharge Date: 05/09/25    Consultations During Hospital Stay:  Urology       Outpatient Tests Requested:  Follow-up with urology    Complications: None    Reason for Admission: Blood in the urine    Hospital Course:   Ramiro Singh is a 70 y.o. male patient with past medical history of prostatitis, BPH with TURP, asthma, obesity who originally presented to the hospital on 5/8/2025 due to hematuria.  Patient had cystoscopy on the day of admission and was discharged home.  Patient later started having bladder pain with urge to urinate and noted to have blood in the catheter.  Patient sent to the hospital and started on CBI with resolution of hematuria.  Patient also seen by urology who recommended discontinuing CBI.  Patient remained stable and be discharged home with outpatient follow-up      Please see above list of diagnoses and related plan for additional information.     Condition at Discharge: fair    Discharge Day Visit / Exam:   Subjective: Patient did report abdominal pain especially with movement  Vitals: Blood Pressure: 144/89 (05/09/25 0830)  Pulse: 93 (05/09/25 0830)  Temperature: 97.5 °F (36.4 °C) (05/09/25 0830)  Temp Source: Oral (05/09/25 0002)  Respirations: 18 (05/09/25 0830)  Height: 5' 6\" (167.6 cm) (05/09/25 0002)  Weight - Scale: 94.6 kg (208 lb 8.9 oz) (05/09/25 0002)  SpO2: 94 % (05/09/25 0830)  Physical Exam  Constitutional:       Appearance: Normal appearance.   HENT:      Head: Normocephalic and atraumatic.   Eyes:      Extraocular Movements: Extraocular movements intact.      Pupils: Pupils are equal, round, and reactive to light.   Cardiovascular:      Rate and Rhythm: Normal rate and regular rhythm.      Heart sounds: No murmur " heard.     No gallop.   Pulmonary:      Effort: Pulmonary effort is normal.      Breath sounds: Normal breath sounds.   Abdominal:      General: Bowel sounds are normal.      Palpations: Abdomen is soft.      Tenderness: There is no abdominal tenderness.   Genitourinary:     Comments: CBI with pink-tinged urine  Musculoskeletal:         General: No swelling or deformity. Normal range of motion.      Cervical back: Normal range of motion and neck supple.   Skin:     General: Skin is warm and dry.   Neurological:      General: No focal deficit present.      Mental Status: He is alert.         Discharge instructions/Information to patient and family:   See after visit summary for information provided to patient and family.      Provisions for Follow-Up Care:  See after visit summary for information related to follow-up care and any pertinent home health orders.      Mobility at time of Discharge:   Basic Mobility Inpatient Raw Score: 20  JH-HLM Goal: 6: Walk 10 steps or more  JH-HLM Achieved: 2: Bed activities/Dependent transfer  HLM Goal achieved. Continue to encourage appropriate mobility.     Disposition:   Home    Planned Readmission: No    Discharge Medications:  See after visit summary for reconciled discharge medications provided to patient and/or family.      Administrative Statements   Discharge Statement:  I have spent a total time of 35 minutes in caring for this patient on the day of the visit/encounter. .    **Please Note: This note may have been constructed using a voice recognition system**

## 2025-05-09 NOTE — PROGRESS NOTES
"Progress Note - Urology      Patient: Ramiro Singh   : 1955 Sex: male   MRN: [id number]     CSN: [id number]  Unit/Bed#: [address] [place] [address]     SUBJECTIVE:   Patient discharged home yesterday at 5:00 status post internal urethrotomy fulguration prostate bleeding bladder bladder retrograde pyelogram with mild hematuria noted wishing to be discharged presenting back to the ER last night with clot irrigated admitted overnight on CBI seen out the bedside with out CBI urine clear      Objective   Vitals: /89   Pulse 93   Temp 97.5 °F (36.4 °C)   Resp 18   Ht 5' 6\" (1.676 m)   Wt 94.6 kg (208 lb 8.9 oz)   SpO2 94%   BMI 33.66 kg/m²     I/O last 24 hours:  In: -   Out: 11364 [Urine:63195]      Physical Exam:   General Alert awake   Normocephalic atraumatic PERRLA  Lungs clear bilaterally  Cardiac normal S1 normal S2  Abdomen soft, flank pain  Extremities no edema      Lab Results: CBC:   Lab Results   Component Value Date    WBC 12.18 (H) 2025    HGB 11.5 (L) 2025    HCT 36.1 (L) 2025    MCV 64 (L) 2025     2025    RBC 5.61 2025    MCH 20.5 (L) 2025    MCHC 31.9 2025    RDW 16.0 (H) 2025    MPV 10.4 2025    NRBC 0 2025     CMP:   Lab Results   Component Value Date     2025    CO2 22 2025    BUN 13 2025    CREATININE 0.77 2025    CALCIUM 8.2 (L) 2025    AST 22 2025    ALT 43 2025    ALKPHOS 50 2025    EGFR 93 2025     Urinalysis:   Lab Results   Component Value Date    COLORU red 07/15/2024    CLARITYU clear 07/15/2024    GLUCOSEU - 07/15/2024    BLOODU - 07/15/2024     Urine Culture:   Lab Results   Component Value Date    URINECX No Growth <1000 cfu/mL 2024     PSA:   Lab Results   Component Value Date    PSA 1.811 2024         Assessment/ Plan:  Status post cystoscopy internal urethrotomy bilateral retrograde Polygram fulguration prostate bladder biopsy with " fulguration day #1  Patient may have mild penile bleeding from previous DVIU stricture ligation  Hematuria noted from [place] secondary to prostate bleeding  Patient wishes to go home to be discharged and we will see him in the office on Monday to have [place] removed          Gordon Cat MD

## 2025-05-09 NOTE — PLAN OF CARE
Problem: PAIN - ADULT  Goal: Verbalizes/displays adequate comfort level or baseline comfort level  Description: Interventions:- Encourage patient to monitor pain and request assistance- Assess pain using appropriate pain scale- Administer analgesics based on type and severity of pain and evaluate response- Implement non-pharmacological measures as appropriate and evaluate response- Consider cultural and social influences on pain and pain management- Notify physician/advanced practitioner if interventions unsuccessful or patient reports new pain  Outcome: Progressing     Problem: INFECTION - ADULT  Goal: Absence or prevention of progression during hospitalization  Description: INTERVENTIONS:- Assess and monitor for signs and symptoms of infection- Monitor lab/diagnostic results- Monitor all insertion sites, i.e. indwelling lines, tubes, and drains- Monitor endotracheal if appropriate and nasal secretions for changes in amount and color- [place] appropriate cooling/warming therapies per order- Administer medications as ordered- Instruct and encourage patient and family to use good hand hygiene technique- Identify and instruct in appropriate isolation precautions for identified infection/condition  Outcome: Progressing  Goal: Absence of fever/infection during neutropenic period  Description: INTERVENTIONS:- Monitor WBC  Outcome: Progressing     Problem: SAFETY ADULT  Goal: Patient will remain free of falls  Description: INTERVENTIONS:- Educate patient/family on patient safety including physical limitations- Instruct patient to call for assistance with activity - Consult OT/PT to assist with strengthening/mobility - Keep Call bell within reach- Keep bed low and locked with side rails adjusted as appropriate- Keep care items and personal belongings within reach- Initiate and maintain comfort rounds- Make Fall Risk Sign visible to staff- Offer Toileting every 2 Hours, in advance of need- Initiate/Maintain bed alarm-  Obtain necessary fall risk management equipment: alarm- Apply yellow socks and bracelet for high fall risk patients- Consider moving patient to room near nurses station  Outcome: Progressing  Goal: Maintain or return to baseline ADL function  Description: INTERVENTIONS:-  Assess patient's ability to carry out ADLs; assess patient's baseline for ADL function and identify physical deficits which impact ability to perform ADLs (bathing, care of mouth/teeth, toileting, grooming, dressing, etc.)- Assess/evaluate cause of self-care deficits - Assess range of motion- Assess patient's mobility; develop plan if impaired- Assess patient's need for assistive devices and provide as appropriate- Encourage maximum independence but intervene and supervise when necessary- Involve family in performance of ADLs- Assess for home care needs following discharge - Consider OT consult to assist with ADL evaluation and planning for discharge- Provide patient education as appropriate  Outcome: Progressing  Goal: Maintains/Returns to pre admission functional level  Description: INTERVENTIONS:- Perform AM-PAC 6 Click Basic Mobility/ Daily Activity assessment daily.- Set and communicate daily mobility goal to care team and patient/family/caregiver. - Collaborate with rehabilitation services on mobility goals if consulted- Perform Range of Motion 3 times a day.- Reposition patient every 2 hours.- Dangle patient 3 times a day- Stand patient 3 times a day- Ambulate patient 3 times a day- Out of bed to chair 3 times a day - Out of bed for meals 3 times a day- Out of bed for toileting- Record patient progress and toleration of activity level   Outcome: Progressing     Problem: DISCHARGE PLANNING  Goal: Discharge to home or other facility with appropriate resources  Description: INTERVENTIONS:- Identify barriers to discharge w/patient and caregiver- Arrange for needed discharge resources and transportation as appropriate- Identify discharge learning  needs (meds, wound care, etc.)- Arrange for interpretive services to assist at discharge as needed- Refer to Case Management Department for coordinating discharge planning if the patient needs post-hospital services based on physician/advanced practitioner order or complex needs related to functional status, cognitive ability, or social support system  Outcome: Progressing     Problem: Knowledge Deficit  Goal: Patient/family/caregiver demonstrates understanding of disease process, treatment plan, medications, and discharge instructions  Description: Complete learning assessment and assess knowledge base.Interventions:- Provide teaching at level of understanding- Provide teaching via preferred learning methods  Outcome: Progressing     Problem: GENITOURINARY - ADULT  Goal: Maintains or returns to baseline urinary function  Description: INTERVENTIONS:- Assess urinary function- Encourage oral fluids to ensure adequate hydration if ordered- Administer IV fluids as ordered to ensure adequate hydration- Administer ordered medications as needed- Offer frequent toileting- Follow urinary retention protocol if ordered  Outcome: Progressing  Goal: Absence of urinary retention  Description: INTERVENTIONS:- Assess patient’s ability to void and empty bladder- Monitor I/O- Bladder scan as needed- Discuss with physician/AP medications to alleviate retention as needed- Discuss catheterization for long term situations as appropriate  Outcome: Progressing  Goal: Urinary catheter remains patent  Description: INTERVENTIONS:- Assess patency of urinary catheter- If patient has a chronic holman, consider changing catheter if non-functioning- Follow guidelines for intermittent irrigation of non-functioning urinary catheter  Outcome: Progressing     Problem: METABOLIC, FLUID AND ELECTROLYTES - ADULT  Goal: Electrolytes maintained within normal limits  Description: INTERVENTIONS:- Monitor labs and assess patient for signs and symptoms of  electrolyte imbalances- Administer electrolyte replacement as ordered- Monitor response to electrolyte replacements, including repeat lab results as appropriate- Instruct patient on fluid and nutrition as appropriate  Outcome: Progressing  Goal: Fluid balance maintained  Description: INTERVENTIONS:- Monitor labs - Monitor I/O and WT- Instruct patient on fluid and nutrition as appropriate- Assess for signs & symptoms of volume excess or deficit  Outcome: Progressing  Goal: Glucose maintained within target range  Description: INTERVENTIONS:- Monitor Blood Glucose as ordered- Assess for signs and symptoms of hyperglycemia and hypoglycemia- Administer ordered medications to maintain glucose within target range- Assess nutritional intake and initiate nutrition service referral as needed  Outcome: Progressing

## 2025-05-11 LAB — BACTERIA BLD CULT: NORMAL

## 2025-05-13 PROCEDURE — 88307 TISSUE EXAM BY PATHOLOGIST: CPT | Performed by: PATHOLOGY

## 2025-05-13 PROCEDURE — 88112 CYTOPATH CELL ENHANCE TECH: CPT | Performed by: PATHOLOGY

## 2025-05-13 PROCEDURE — 88341 IMHCHEM/IMCYTCHM EA ADD ANTB: CPT | Performed by: PATHOLOGY

## 2025-05-13 PROCEDURE — 88342 IMHCHEM/IMCYTCHM 1ST ANTB: CPT | Performed by: PATHOLOGY

## 2025-05-14 LAB — BACTERIA BLD CULT: NORMAL

## 2025-06-18 RX ORDER — TAMSULOSIN HYDROCHLORIDE 0.4 MG/1
0.4 CAPSULE ORAL
COMMUNITY

## 2025-06-18 NOTE — PRE-PROCEDURE INSTRUCTIONS
Pre-Surgery Instructions:   Medication Instructions    loratadine-pseudoephedrine (CLARITIN-D 12-HOUR) 5-120 mg per tablet Take night before surgery    tamsulosin (FLOMAX) 0.4 mg Take night before surgery    Medication instructions for day of surgery reviewed. Please take all instructed medications with only a sip of water. Please do not take any over the counter (non-prescribed) vitamins or supplements for one week prior to date of surgery.      You will receive a call one business day prior to surgery with an arrival time and hospital directions. If your surgery is scheduled on a Monday, the hospital will be calling you on the Friday prior to your surgery. If you have not heard from anyone by 8pm, please call the hospital supervisor through the hospital  at [phone]. ([place] [phone] or [place] [phone]).    Do not eat or drink anything after midnight the night before your surgery, including candy, mints, lifesavers, or chewing gum. Do not drink alcohol 24hrs before your surgery. Try not to smoke at least 24hrs before your surgery.       Follow the pre surgery showering instructions as listed in the “My Surgical Experience Booklet” or otherwise provided by your surgeon's office. Do not use a blade to shave the surgical area 1 week before surgery. It is okay to use a clean electric clippers up to 24 hours before surgery. Do not apply any lotions, creams, including makeup, cologne, deodorant, or perfumes after showering on the day of your surgery. Do not use dry shampoo, hair spray, hair gel, or any type of hair products.     No contact lenses, eye make-up, or artificial eyelashes. Remove nail polish, including gel polish, and any artificial, gel, or acrylic nails if possible. Remove all jewelry including rings and body piercing jewelry.     Wear causal clothing that is easy to take on and off. Consider your type of surgery.    Keep any valuables, jewelry, piercings at home. Please bring any  specially ordered equipment (sling, braces) if indicated.    Arrange for a responsible person to drive you to and from the hospital on the day of your surgery. Please confirm the visitor policy for the day of your procedure when you receive your phone call with an arrival time.     Call the surgeon's office with any new illnesses, exposures, or additional questions prior to surgery.    Please reference your “My Surgical Experience Booklet” for additional information to prepare for your upcoming surgery.

## 2025-06-23 NOTE — H&P
H&P Exam - Urology       Patient: Ramiro Singh   : 1955 Sex: male   MRN: [id number]     CSN: [id number]      History of Present Illness   HPI:  Ramiro Singh is a 70 y.o. male who presents episodic gross hematuria found on recent [facility] workup cystoscopy bilateral retrogrades prostate fulguration bladder biopsy fulguration confirming bladder biopsy path high-grade papillary urothelial carcinoma with lamina propria invasion no muscle present patient aware 40% risk of increasing staging to undergo cystoscopy TURBT right bladder neck tumor aware risk of anesthesia infection bleeding postop [place]        Review of Systems:   Constitutional:  Negative for activity change, fever, chills and diaphoresis.   HENT: Negative for hearing loss and trouble swallowing.   Eyes: Negative for itching and visual disturbance.   Respiratory: Negative for chest tightness and shortness of breath.   Cardiovascular: Negative for chest pain, edema.   Gastrointestinal: Negative for abdominal distention, na abdominal pain, constipation, diarrhea, Nausea and vomiting.   Genitourinary: Negative for decreased urine volume, difficulty urinating, dysuria, enuresis, frequency, hematuria and urgency.   Musculoskeletal: Negative for gait problem and myalgias.   Neurological: Negative for dizziness and headaches.   Hematological: Does not bruise/bleed easily.       Historical Information   Past Medical History[1]  Past Surgical History[2]  Social History   Social History     Substance and Sexual Activity   Alcohol Use Yes    Alcohol/week: 1.0 standard drink of alcohol    Types: 1 Cans of beer per week    Comment: a beer or two once a week     Social History     Substance and Sexual Activity   Drug Use Never     Tobacco Use History[3]  Family History: Family History[4]    Meds/Allergies   No medications prior to admission.  Allergies[5]    Objective   Vitals: There were no vitals taken for this visit.    Physical Exam:  General Alert  awake   Normocephalic atraumatic PERRLA  Lungs clear bilaterally  Cardiac normal S1 normal S2  Abdomen soft, flank pain  Extremities no edema    No intake/output data recorded.    Invasive Devices       Drain  Duration             Urethral Catheter Latex 22 Fr. 46 days                        Lab Results: CBC:   Lab Results   Component Value Date    WBC 12.18 (H) 05/09/2025    HGB 11.5 (L) 05/09/2025    HCT 36.1 (L) 05/09/2025    MCV 64 (L) 05/09/2025     05/09/2025    RBC 5.61 05/09/2025    MCH 20.5 (L) 05/09/2025    MCHC 31.9 05/09/2025    RDW 16.0 (H) 05/09/2025    MPV 10.4 05/09/2025    NRBC 0 05/08/2025     CMP:   Lab Results   Component Value Date     05/09/2025    CO2 22 05/09/2025    BUN 13 05/09/2025    CREATININE 0.77 05/09/2025    CALCIUM 8.2 (L) 05/09/2025    AST 22 05/08/2025    ALT 43 05/08/2025    ALKPHOS 50 05/08/2025    EGFR 93 05/09/2025     Urinalysis:   Lab Results   Component Value Date    COLORU red 07/15/2024    CLARITYU clear 07/15/2024    GLUCOSEU - 07/15/2024    BLOODU - 07/15/2024     Urine Culture:   Lab Results   Component Value Date    URINECX No Growth <1000 cfu/mL 11/05/2024     PSA:   Lab Results   Component Value Date    PSA 1.811 11/02/2024           Assessment/ Plan:  Cystoscopy TURBT deep muscle biopsies  Right bladder neck tumor    Gordon Cat MD       [1]   Past Medical History:  Diagnosis Date    Asthma     Kidney stone     Prostatitis     Urinary tract infection    [2]   Past Surgical History:  Procedure Laterality Date    FL RETROGRADE PYELOGRAM  05/08/2025    MS CYSTOURETHROSCOPY W/DEST &/RMVL MED BLADDER PRUDENCE N/A 05/08/2025    Procedure: FULGERATION OF 1CM BLADDER LESION WITH BIOPSY, BILATERAL RETROGRADE, FULGERATION PROSTATE BLEEDING;  Surgeon: Gordon Cat MD;  Location: [facility];  Service: Urology    MS CYSTOURETHROSCOPY W/INTERNAL URETHROTOMY N/A 05/08/2025    Procedure: CYSTOSCOPY, DIRECT VISUAL INTERAL URETHROTOMY (DVIU);  Surgeon: Gordon Cat,  MD;  Location: [facility];  Service: Urology    PROSTATE SURGERY  2012    TURP 12 years ago    WISDOM TOOTH EXTRACTION     [3]   Social History  Tobacco Use   Smoking Status Former    Types: Cigars    Passive exposure: Past   Smokeless Tobacco Never   Tobacco Comments    Cigar every now and then   [4] No family history on file.  [5] No Known Allergies

## 2025-06-25 ENCOUNTER — ANESTHESIA EVENT (OUTPATIENT)
Dept: PERIOP | Facility: HOSPITAL | Age: 70
End: 2025-06-25
Payer: COMMERCIAL

## 2025-06-25 NOTE — ANESTHESIA PREPROCEDURE EVALUATION
Procedure:  CYSTOSCOPY WITH RETROGRADE PYELOGRAM (Bilateral: Bladder)  TRANSURETHRAL RESECTION OF BLADDER TUMOR (TURBT) (Bladder)    Relevant Problems   /RENAL   (+) BPH with obstruction/lower urinary tract symptoms      PULMONARY   (+) Asthma      Urinary   (+) Gross hematuria      Other   (+) Obesity (BMI 30.0-34.9)        Physical Exam    Airway     Mallampati score: II  TM Distance: >3 FB  Neck ROM: full  Mouth opening: >= 4 cm      Cardiovascular  Rhythm: regular, Rate: normal    Dental   No notable dental hx     Pulmonary   Breath sounds clear to auscultation    Neurological    He appears awake, alert and oriented x3.      Other Findings        Anesthesia Plan  ASA Score- 2     Anesthesia Type- general with ASA Monitors.         Additional Monitors:     Airway Plan: LMA and LMA.           Plan Factors-    Chart reviewed.        Patient is not a current smoker.              Induction- intravenous.    Postoperative Plan- Plan for postoperative opioid use.   Monitoring Plan - Monitoring plan - standard ASA monitoring  Post Operative Pain Plan - plan for postoperative opioid use    Perioperative Resuscitation Plan - Level 1 - Full Code.       Informed Consent- Anesthetic plan and risks discussed with patient.  I personally reviewed this patient with the CRNA. Discussed and agreed on the Anesthesia Plan with the CRNA..      NPO Status:  No vitals data found for the desired time range.

## 2025-06-26 ENCOUNTER — HOSPITAL ENCOUNTER (OUTPATIENT)
Facility: HOSPITAL | Age: 70
Setting detail: OUTPATIENT SURGERY
Discharge: HOME | End: 2025-06-26
Attending: SPECIALIST | Admitting: SPECIALIST
Payer: COMMERCIAL

## 2025-06-26 ENCOUNTER — ANESTHESIA (OUTPATIENT)
Dept: PERIOP | Facility: HOSPITAL | Age: 70
End: 2025-06-26
Payer: COMMERCIAL

## 2025-06-26 ENCOUNTER — APPOINTMENT (OUTPATIENT)
Dept: RADIOLOGY | Facility: HOSPITAL | Age: 70
End: 2025-06-26
Payer: COMMERCIAL

## 2025-06-26 VITALS
HEART RATE: 72 BPM | DIASTOLIC BLOOD PRESSURE: 74 MMHG | WEIGHT: 204.37 LBS | BODY MASS INDEX: 32.84 KG/M2 | RESPIRATION RATE: 18 BRPM | TEMPERATURE: 97.7 F | SYSTOLIC BLOOD PRESSURE: 121 MMHG | HEIGHT: 66 IN | OXYGEN SATURATION: 97 %

## 2025-06-26 DIAGNOSIS — C67.9 MALIGNANT NEOPLASM OF BLADDER, UNSPECIFIED (HCC): ICD-10-CM

## 2025-06-26 PROBLEM — F17.200 CURRENT SMOKER ON SOME DAYS: Status: RESOLVED | Noted: 2025-05-08 | Resolved: 2025-06-26

## 2025-06-26 PROCEDURE — 88112 CYTOPATH CELL ENHANCE TECH: CPT | Performed by: PATHOLOGY

## 2025-06-26 PROCEDURE — 88342 IMHCHEM/IMCYTCHM 1ST ANTB: CPT | Performed by: PATHOLOGY

## 2025-06-26 PROCEDURE — 88307 TISSUE EXAM BY PATHOLOGIST: CPT | Performed by: PATHOLOGY

## 2025-06-26 PROCEDURE — 88341 IMHCHEM/IMCYTCHM EA ADD ANTB: CPT | Performed by: PATHOLOGY

## 2025-06-26 PROCEDURE — 74420 UROGRAPHY RTRGR +-KUB: CPT

## 2025-06-26 RX ORDER — GLYCINE 1.5 G/100ML
SOLUTION IRRIGATION AS NEEDED
Status: DISCONTINUED | OUTPATIENT
Start: 2025-06-26 | End: 2025-06-26 | Stop reason: HOSPADM

## 2025-06-26 RX ORDER — FENTANYL CITRATE 50 UG/ML
INJECTION, SOLUTION INTRAMUSCULAR; INTRAVENOUS AS NEEDED
Status: DISCONTINUED | OUTPATIENT
Start: 2025-06-26 | End: 2025-06-26

## 2025-06-26 RX ORDER — FENTANYL CITRATE/PF 50 MCG/ML
50 SYRINGE (ML) INJECTION
Status: DISCONTINUED | OUTPATIENT
Start: 2025-06-26 | End: 2025-06-26 | Stop reason: HOSPADM

## 2025-06-26 RX ORDER — OXYCODONE AND ACETAMINOPHEN 5; 325 MG/1; MG/1
1 TABLET ORAL ONCE AS NEEDED
Refills: 0 | Status: COMPLETED | OUTPATIENT
Start: 2025-06-26 | End: 2025-06-26

## 2025-06-26 RX ORDER — PROPOFOL 10 MG/ML
INJECTION, EMULSION INTRAVENOUS AS NEEDED
Status: DISCONTINUED | OUTPATIENT
Start: 2025-06-26 | End: 2025-06-26

## 2025-06-26 RX ORDER — CEFAZOLIN SODIUM 2 G/50ML
2000 SOLUTION INTRAVENOUS ONCE
Status: COMPLETED | OUTPATIENT
Start: 2025-06-26 | End: 2025-06-26

## 2025-06-26 RX ORDER — ONDANSETRON 2 MG/ML
INJECTION INTRAMUSCULAR; INTRAVENOUS AS NEEDED
Status: DISCONTINUED | OUTPATIENT
Start: 2025-06-26 | End: 2025-06-26

## 2025-06-26 RX ORDER — SODIUM CHLORIDE, SODIUM LACTATE, POTASSIUM CHLORIDE, CALCIUM CHLORIDE 600; 310; 30; 20 MG/100ML; MG/100ML; MG/100ML; MG/100ML
75 INJECTION, SOLUTION INTRAVENOUS CONTINUOUS
Status: DISCONTINUED | OUTPATIENT
Start: 2025-06-26 | End: 2025-06-26 | Stop reason: HOSPADM

## 2025-06-26 RX ORDER — ONDANSETRON 2 MG/ML
4 INJECTION INTRAMUSCULAR; INTRAVENOUS ONCE AS NEEDED
Status: DISCONTINUED | OUTPATIENT
Start: 2025-06-26 | End: 2025-06-26 | Stop reason: HOSPADM

## 2025-06-26 RX ORDER — LIDOCAINE HYDROCHLORIDE 10 MG/ML
INJECTION, SOLUTION EPIDURAL; INFILTRATION; INTRACAUDAL; PERINEURAL AS NEEDED
Status: DISCONTINUED | OUTPATIENT
Start: 2025-06-26 | End: 2025-06-26

## 2025-06-26 RX ORDER — MIDAZOLAM HYDROCHLORIDE 2 MG/2ML
INJECTION, SOLUTION INTRAMUSCULAR; INTRAVENOUS AS NEEDED
Status: DISCONTINUED | OUTPATIENT
Start: 2025-06-26 | End: 2025-06-26

## 2025-06-26 RX ORDER — DEXAMETHASONE SODIUM PHOSPHATE 10 MG/ML
INJECTION, SOLUTION INTRAMUSCULAR; INTRAVENOUS AS NEEDED
Status: DISCONTINUED | OUTPATIENT
Start: 2025-06-26 | End: 2025-06-26

## 2025-06-26 RX ADMIN — CEFAZOLIN SODIUM 2000 MG: 2 SOLUTION INTRAVENOUS at 13:06

## 2025-06-26 RX ADMIN — ONDANSETRON 4 MG: 2 INJECTION INTRAMUSCULAR; INTRAVENOUS at 13:13

## 2025-06-26 RX ADMIN — FENTANYL CITRATE 50 MCG: 50 INJECTION, SOLUTION INTRAMUSCULAR; INTRAVENOUS at 13:09

## 2025-06-26 RX ADMIN — MIDAZOLAM 2 MG: 1 INJECTION INTRAMUSCULAR; INTRAVENOUS at 13:06

## 2025-06-26 RX ADMIN — LIDOCAINE HYDROCHLORIDE 50 MG: 10 INJECTION, SOLUTION EPIDURAL; INFILTRATION; INTRACAUDAL; PERINEURAL at 13:09

## 2025-06-26 RX ADMIN — DEXAMETHASONE SODIUM PHOSPHATE 10 MG: 10 INJECTION, SOLUTION INTRAMUSCULAR; INTRAVENOUS at 13:13

## 2025-06-26 RX ADMIN — FENTANYL CITRATE 25 MCG: 50 INJECTION, SOLUTION INTRAMUSCULAR; INTRAVENOUS at 13:20

## 2025-06-26 RX ADMIN — PROPOFOL 200 MG: 10 INJECTION, EMULSION INTRAVENOUS at 13:09

## 2025-06-26 RX ADMIN — FENTANYL CITRATE 25 MCG: 50 INJECTION, SOLUTION INTRAMUSCULAR; INTRAVENOUS at 13:15

## 2025-06-26 RX ADMIN — FENTANYL CITRATE 50 MCG: 50 INJECTION, SOLUTION INTRAMUSCULAR; INTRAVENOUS at 14:28

## 2025-06-26 RX ADMIN — FENTANYL CITRATE 50 MCG: 50 INJECTION, SOLUTION INTRAMUSCULAR; INTRAVENOUS at 14:55

## 2025-06-26 RX ADMIN — OXYCODONE HYDROCHLORIDE AND ACETAMINOPHEN 1 TABLET: 5; 325 TABLET ORAL at 15:53

## 2025-06-26 RX ADMIN — SODIUM CHLORIDE, SODIUM LACTATE, POTASSIUM CHLORIDE, AND CALCIUM CHLORIDE 75 ML/HR: .6; .31; .03; .02 INJECTION, SOLUTION INTRAVENOUS at 10:55

## 2025-06-26 NOTE — PERIOPERATIVE NURSING NOTE
Patient d/c to home at this time with Via his wife and 2 sisters w/c. Pt left with all belongings. Iv was D/C intact with dry sterile dressing. [place] discharged home with a [place] catheter. [place] care education provided to pt and his wife. Encouraged to keep follow up appointments, Verbalized understanding. D/C instructions reviewed and explained with patient and family member. Verbalized understanding.

## 2025-06-26 NOTE — OP NOTE
OPERATIVE REPORT  PATIENT NAME: Ramiro Singh    :  1955  MRN: [id number]  Pt Location: [facility] OR ROOM 04    SURGERY DATE: 2025    Surgeons and Role:     * Gordon Cat MD - Primary    Preop Diagnosis:  Malignant neoplasm of bladder, unspecified (HCC) [C67.9]    Post-Op Diagnosis Codes:     * Malignant neoplasm of bladder, unspecified (HCC) [C67.9]    Procedure(s):  Bilateral - CYSTOSCOPY WITH RETROGRADE PYELOGRAM  TRANSURETHRAL RESECTION OF BLADDER TUMOR (TURBT)    Specimen(s):  ID Type Source Tests Collected by Time Destination   1 : urine cytology Urine Urine, Cystoscopic CYTOLOGY, URINE Gordon Cat MD 2025 1321    2 : rt bladder neck tumor Tissue Urinary Bladder TISSUE EXAM Gordon Cat MD 2025 1321        Estimated Blood Loss:   Minimal    Drains:  Urethral Catheter Three way 22 Fr. (Active)   Number of days: 0       Anesthesia Type:   General    Operative Indications:  Malignant neoplasm of bladder, unspecified (HCC) [C67.9]  This 70-year-old male recently worked up for gross painless hematuria found to have hypervascular prostatic urethra with bleeding undergoing aggressive fulguration also noted to have small suspicious lesion right bladder neck and for undergoing removal confirming on May 8 high-grade urothelial carcinoma with focal lamina propria invasion making this a T1 grade 3 tumor patient was an avid cigar smoker patient now to return to the OR for cystoscopy TURBT entire 4 cm right floor bladder neck as well as repeat bilateral retrogrades    Operative Findings:  Bilateral retrogrades confirm normal filling with J hooking of the distal ureters no obvious intraluminal defect  3.0 cm hypervascular prostatic urethra immediate bleeding noted as scope was inserted undergoing aggressive resection  TUR BT 3-1/2 cm right floor bladder neck         Complications:   None    Procedure and Technique:  Patient identified in the holding area consent reviewed wished to proceed with  procedure placed in the OR suite after anesthesia was induced placed in lithotomy position draped and prepped in a sterile fashion timeout performed a 22 [place]i cystoscope with 30 lens was passed through to the bladder anterior to showed no other maladies post urethra confirmed with 3.0 cm hypervascular by lobar prostatic urethra scope inserted into bladder lumen with immediate bleeding noted from the prostatic urethra 5 Yemeni open-ended catheter placed to the right orifice right retrograde pyelogram confirmed normal filling and drainage left retrograde pyelogram confirmed normal filling and drainage scope was pulled out to the right bladder neck for area from previous resection was noted with changes also noted laterally encompassing 3-1/2 to 4 cm the cystoscope was removed and the 27 [place]i resectoscope was placed aggressive fulguration of the prostatic urethra was performed the entire 4 cm area was then resected down into fat and fibers extending into the prostate aggressive fulguration the Ellik was used to remove the specimen and sent to pathology with some of the bladder tumor the scope was removed and 22 three-way [place] cath inserted with 50 cc in the balloon left the bag drainage time of dictation mild hematuria noted patient had procedure awakened taken to recovery in stable condition   I was present for the entire procedure.    Patient Disposition:  PACU          SIGNATURE: Gordon Cat MD  DATE: June 26, 2025  TIME: 1:55 PM

## 2025-06-26 NOTE — PROGRESS NOTES
"Progress Note - Urology      Patient: Ramiro Singh   : 1955 Sex: male   MRN: [id number]     CSN: [id number]  Unit/Bed#: OR POOL     SUBJECTIVE:   Patient surgical wrap unit  No change to history physical aware risk of anesthesia infection bleeding postop catheter      Objective   Vitals: /68   Pulse 79   Temp 98.8 °F (37.1 °C) (Skin)   Resp 18   Ht 5' 6\" (1.676 m)   Wt 92.7 kg (204 lb 5.9 oz)   SpO2 97%   BMI 32.99 kg/m²     No intake/output data recorded.      Physical Exam:   General Alert awake   Normocephalic atraumatic PERRLA  Lungs clear bilaterally  Cardiac normal S1 normal S2  Abdomen soft, flank pain  Extremities no edema      Lab Results: CBC:   Lab Results   Component Value Date    WBC 12.18 (H) 2025    HGB 11.5 (L) 2025    HCT 36.1 (L) 2025    MCV 64 (L) 2025     2025    RBC 5.61 2025    MCH 20.5 (L) 2025    MCHC 31.9 2025    RDW 16.0 (H) 2025    MPV 10.4 2025    NRBC 0 2025     CMP:   Lab Results   Component Value Date     2025    CO2 22 2025    BUN 13 2025    CREATININE 0.77 2025    CALCIUM 8.2 (L) 2025    AST 22 2025    ALT 43 2025    ALKPHOS 50 2025    EGFR 93 2025     Urinalysis:   Lab Results   Component Value Date    COLORU red 07/15/2024    CLARITYU clear 07/15/2024    GLUCOSEU - 07/15/2024    BLOODU - 07/15/2024     Urine Culture:   Lab Results   Component Value Date    URINECX No Growth <1000 cfu/mL 2024     PSA:   Lab Results   Component Value Date    PSA 1.811 2024         Assessment/ Plan:  Cystoscopy bilateral retrograde pyelograms  Deep muscle biopsies TUR BT          Gordon Cat MD  "

## 2025-06-26 NOTE — ANESTHESIA POSTPROCEDURE EVALUATION
Post-Op Assessment Note    CV Status:  Stable  Pain Score: 0    Pain management: adequate       Mental Status:  Alert   Hydration Status:  Stable   PONV Controlled:  None   Airway Patency:  Patent     Post Op Vitals Reviewed: Yes    No anethesia notable event occurred.    Staff: Anesthesiologist, CRNA           Last Filed PACU Vitals:  Vitals Value Taken Time   Temp     Pulse 88    /78    Resp 14    SpO2 98

## 2025-06-26 NOTE — ANESTHESIA POSTPROCEDURE EVALUATION
Post-Op Assessment Note    CV Status:  Stable  Pain Score: 1    Pain management: adequate       Mental Status:  Awake   Hydration Status:  Stable   PONV Controlled:  None   Airway Patency:  Patent     Post Op Vitals Reviewed: Yes    No anethesia notable event occurred.    Staff: Anesthesiologist         Last Filed PACU Vitals:  Vitals Value Taken Time   Temp 97.7 °F (36.5 °C) 06/26/25 14:00   Pulse 86 06/26/25 14:46   /78 06/26/25 14:46   Resp 16 06/26/25 14:46   SpO2 100 % 06/26/25 14:15       Modified Dejuan:     Vitals Value Taken Time   Activity 2 06/26/25 14:03   Respiration 2 06/26/25 14:03   Circulation 2 06/26/25 14:03   Consciousness 2 06/26/25 14:03   Oxygen Saturation 2 06/26/25 14:03     Modified Dejuan Score: 10

## 2025-07-02 PROCEDURE — 88112 CYTOPATH CELL ENHANCE TECH: CPT | Performed by: PATHOLOGY

## 2025-07-02 PROCEDURE — 88305 TISSUE EXAM BY PATHOLOGIST: CPT | Performed by: PATHOLOGY

## 2025-07-02 PROCEDURE — 88342 IMHCHEM/IMCYTCHM 1ST ANTB: CPT | Performed by: PATHOLOGY

## 2025-07-02 PROCEDURE — 88341 IMHCHEM/IMCYTCHM EA ADD ANTB: CPT | Performed by: PATHOLOGY

## (undated) DEVICE — LUBRICANT JELLY SURGILUBE TUBE 4OZ FLIP TOP

## (undated) DEVICE — CYSTOSCOPY PACK: Brand: CONVERTORS

## (undated) DEVICE — SPONGE 4 X 4 XRAY 16 PLY STRL LF RFD

## (undated) DEVICE — SYRINGE 20ML LL

## (undated) DEVICE — CATH URETERAL 5FR X 70 CM FLEX TIP POLYUR BARD

## (undated) DEVICE — FABRIC REINFORCED, SURGICAL GOWN, XL: Brand: CONVERTORS

## (undated) DEVICE — ELECTRODE,CUTTING,STERILE.24FR: Brand: N.A.

## (undated) DEVICE — GROUNDING PAD UNIVERSAL SLW

## (undated) DEVICE — POUCH URO CATCHER II STERILE

## (undated) DEVICE — SOLUTION BOWL: Brand: KENDALL

## (undated) DEVICE — STERILE DOUBLE BASIN SET PACK: Brand: CARDINAL HEALTH

## (undated) DEVICE — STANDARD SURGICAL GOWN, L: Brand: CONVERTORS

## (undated) DEVICE — SPECIMEN CONTAINER STERILE PEEL PACK

## (undated) DEVICE — CATH URET POLYURETHANE 5FR 28IN WHISTLE TIP

## (undated) DEVICE — KNIFE,COLD,STERILE, SINGLE USE: Brand: N.A.

## (undated) DEVICE — CYSTO TUBING TUR Y IRRIGATION

## (undated) DEVICE — Device

## (undated) DEVICE — GLOVE SRG LF STRL BGL SKNSNS 8 PF

## (undated) DEVICE — TOWEL SET X-RAY

## (undated) DEVICE — EVACUATOR BLADDER ELLIK DISP STRL

## (undated) DEVICE — BAG URINE DRAINAGE 4000ML CONTINUOUS IRR

## (undated) DEVICE — SPONGE STICK WITH PVP-I: Brand: KENDALL

## (undated) DEVICE — BETADINE PREP STICKS